# Patient Record
Sex: FEMALE | Race: OTHER | URBAN - METROPOLITAN AREA
[De-identification: names, ages, dates, MRNs, and addresses within clinical notes are randomized per-mention and may not be internally consistent; named-entity substitution may affect disease eponyms.]

---

## 2021-11-02 ENCOUNTER — HOSPITAL ENCOUNTER (OUTPATIENT)
Dept: LAB | Age: 76
Discharge: HOME | End: 2021-11-02

## 2021-11-02 ENCOUNTER — LAB ONLY (OUTPATIENT)
Dept: FAMILY MEDICINE CLINIC | Age: 76
End: 2021-11-02

## 2021-11-02 ENCOUNTER — VIRTUAL VISIT (OUTPATIENT)
Dept: FAMILY MEDICINE CLINIC | Age: 76
End: 2021-11-02

## 2021-11-02 DIAGNOSIS — H53.8 BLURRED VISION: ICD-10-CM

## 2021-11-02 DIAGNOSIS — E03.9 ACQUIRED HYPOTHYROIDISM: Primary | ICD-10-CM

## 2021-11-02 DIAGNOSIS — E03.9 ACQUIRED HYPOTHYROIDISM: ICD-10-CM

## 2021-11-02 PROCEDURE — 85027 COMPLETE CBC AUTOMATED: CPT

## 2021-11-02 PROCEDURE — 99441 PR PHYS/QHP TELEPHONE EVALUATION 5-10 MIN: CPT | Performed by: FAMILY MEDICINE

## 2021-11-02 PROCEDURE — 84443 ASSAY THYROID STIM HORMONE: CPT

## 2021-11-02 PROCEDURE — 80053 COMPREHEN METABOLIC PANEL: CPT

## 2021-11-02 RX ORDER — LEVOTHYROXINE SODIUM 100 UG/1
100 TABLET ORAL
COMMUNITY
End: 2021-11-02 | Stop reason: SDUPTHER

## 2021-11-02 RX ORDER — LEVOTHYROXINE SODIUM 100 UG/1
100 TABLET ORAL
Qty: 90 TABLET | Refills: 1 | Status: SHIPPED | OUTPATIENT
Start: 2021-11-02 | End: 2022-05-13

## 2021-11-02 NOTE — PROGRESS NOTES
Eli Ortega (: 1945) is a 76 y.o. female, new patient, here for evaluation of the following chief complaint(s):   Thyroid Problem (This is a new pt with Thyroid Hx for 20 years and had no taking medicine x 2 years ), Blurred Vision (x 1 month), and Medication Refill       ASSESSMENT/PLAN:  1. Acquired hypothyroidism  Continue with current dose and recheck labs. -     TSH 3RD GENERATION; Future  2. Blurred vision  New sx, will have patient make F2F visit and check labs. -     CBC W/O DIFF; Future  -     METABOLIC PANEL, COMPREHENSIVE; Future  -     REFERRAL TO OPHTHALMOLOGY      Return for follow up for F2F next available for check up. SUBJECTIVE/OBJECTIVE:  HPI New patient. Visit assisted with daughter, Marge Thorne From [facility], in [facility] for 6 months. Hypothyroid:  On thyroid replacement for 20 years. Patient is taking Synthroid 100 mcg regularly but is almost out. Has not had labs in about 3 years due to the pandemic. Blurred Vision:  Increasing blurred vision x 1 month. Review of Systems     Patient-Reported Systolic (Top): 516  Patient-Reported Diastolic (Bottom): 80  Patient-Reported Pulse: 89  Patient-Reported Weight: 160 lb  Patient-Reported LMP: Menopause    Physical Exam    On this date 2021 I have spent 9 minutes reviewing previous notes, test results and face to face (virtual) with the patient discussing the diagnosis and importance of compliance with the treatment plan as well as documenting on the day of the visit. Eli Ortega, was evaluated through a synchronous (real-time) audio-video encounter. The patient (or guardian if applicable) is aware that this is a billable service. Verbal consent to proceed has been obtained within the past 12 months.  The visit was conducted pursuant to the emergency declaration under the [address] [facility] [place], [address] waiver authority and the [employer] and [employer] Appropriations Act. Patient identification was verified, and a caregiver was present when appropriate. The patient was located in a state where the provider was credentialed to provide care. Patient identification was verified at the start of the visit: YES    Services were provided through a phone synchronous discussion virtually to substitute for in-person clinic visit. Patient was located at home and provider was located in office or at home. An electronic signature was used to authenticate this note.   -- Maryjo Vega MD

## 2021-11-02 NOTE — PROGRESS NOTES
Per patient her BP today is 137/80 on RA. HR 89  98100 am: CMA called the patient's daughter Mrs Breanna Nunez to explained that medicine has no coupon available and price will be $10. Per provider, also,  the following instructions were give to the patient: \"follow up for F2F next available for check up   Labs ordered.  Can be done next available or when I see her F2F. Medication eRxd,  Referral to [facility] for blurred vision. \" Dtr of patient verbalized understanding

## 2021-11-03 LAB
ALBUMIN SERPL-MCNC: 3.6 G/DL (ref 3.5–5)
ALBUMIN/GLOB SERPL: 1.1 {RATIO} (ref 1.1–2.2)
ALP SERPL-CCNC: 79 U/L (ref 45–117)
ALT SERPL-CCNC: 28 U/L (ref 12–78)
ANION GAP SERPL CALC-SCNC: 6 MMOL/L (ref 5–15)
AST SERPL-CCNC: 17 U/L (ref 15–37)
BILIRUB SERPL-MCNC: 0.3 MG/DL (ref 0.2–1)
BUN SERPL-MCNC: 18 MG/DL (ref 6–20)
BUN/CREAT SERPL: 22 (ref 12–20)
CALCIUM SERPL-MCNC: 8.8 MG/DL (ref 8.5–10.1)
CHLORIDE SERPL-SCNC: 108 MMOL/L (ref 97–108)
CO2 SERPL-SCNC: 26 MMOL/L (ref 21–32)
CREAT SERPL-MCNC: 0.83 MG/DL (ref 0.55–1.02)
ERYTHROCYTE [DISTWIDTH] IN BLOOD BY AUTOMATED COUNT: 12.5 % (ref 11.5–14.5)
GLOBULIN SER CALC-MCNC: 3.4 G/DL (ref 2–4)
GLUCOSE SERPL-MCNC: 102 MG/DL (ref 65–100)
HCT VFR BLD AUTO: 38 % (ref 35–47)
HGB BLD-MCNC: 12.1 G/DL (ref 11.5–16)
MCH RBC QN AUTO: 30 PG (ref 26–34)
MCHC RBC AUTO-ENTMCNC: 31.8 G/DL (ref 30–36.5)
MCV RBC AUTO: 94.3 FL (ref 80–99)
NRBC # BLD: 0 K/UL (ref 0–0.01)
NRBC BLD-RTO: 0 PER 100 WBC
PLATELET # BLD AUTO: 286 K/UL (ref 150–400)
PMV BLD AUTO: 10.9 FL (ref 8.9–12.9)
POTASSIUM SERPL-SCNC: 4.1 MMOL/L (ref 3.5–5.1)
PROT SERPL-MCNC: 7 G/DL (ref 6.4–8.2)
RBC # BLD AUTO: 4.03 M/UL (ref 3.8–5.2)
SODIUM SERPL-SCNC: 140 MMOL/L (ref 136–145)
TSH SERPL DL<=0.05 MIU/L-ACNC: 3.33 UIU/ML (ref 0.36–3.74)
WBC # BLD AUTO: 7.5 K/UL (ref 3.6–11)

## 2021-11-04 ENCOUNTER — OFFICE VISIT (OUTPATIENT)
Dept: FAMILY MEDICINE CLINIC | Age: 76
End: 2021-11-04

## 2021-11-04 VITALS
OXYGEN SATURATION: 97 % | TEMPERATURE: 98.6 F | WEIGHT: 172.4 LBS | SYSTOLIC BLOOD PRESSURE: 132 MMHG | DIASTOLIC BLOOD PRESSURE: 71 MMHG | HEART RATE: 71 BPM

## 2021-11-04 DIAGNOSIS — B35.1 ONYCHOMYCOSIS: ICD-10-CM

## 2021-11-04 DIAGNOSIS — E03.9 ACQUIRED HYPOTHYROIDISM: Primary | ICD-10-CM

## 2021-11-04 DIAGNOSIS — H53.8 BLURRED VISION: ICD-10-CM

## 2021-11-04 PROCEDURE — 99213 OFFICE O/P EST LOW 20 MIN: CPT | Performed by: FAMILY MEDICINE

## 2021-11-04 RX ORDER — CICLOPIROX 80 MG/ML
SOLUTION TOPICAL
Qty: 6.6 ML | Refills: 2 | Status: SHIPPED | OUTPATIENT
Start: 2021-11-04 | End: 2022-07-11 | Stop reason: ALTCHOICE

## 2021-11-04 NOTE — PROGRESS NOTES
Patient seen at discharge with daughter present. Nurse explained the [employer] and about using it for pharmacy discounts. [facility] appt discussed, explained that an OW will call patient to begin a f/s process and then the [facility] Nurse, Rizwan Garcia, will give patient appointment. Staff message sent to OW and to Deven Sellers RN. This has been fully explained to the patient, who indicates understanding.

## 2021-11-04 NOTE — PROGRESS NOTES
Stanislaw Hurley (: 1945) is a 76 y.o. female, established patient, here for evaluation of the following chief complaint(s):  Follow-up (Thyroid, lab work)       ASSESSMENT/PLAN:  1. Acquired hypothyroidism  At goal, continue with current dose. 2. Blurred vision  Labs and general exam is unremarkable. Follow up with Ophthalmology  3. Onychomycosis  Discussed oral vs topical tx. Daughter wanted to avoid oral at this time. Will give trial of Loprox. SUBJECTIVE:  HPI   Presents with daughter, Yanique Mobley From [facility], in [place] for 6 months. Recent labs reviewed with patient and daughter. Hypothyroid:  On thyroid replacement for 20 years. Patient is taking Synthroid 100 mcg regularly but is almost out. Has not had labs in about 3 years due to the pandemic. Blurred Vision:  Increasing blurred vision x 1 month. Finger nail changes. Review of Systems   Constitutional: Negative for diaphoresis, fatigue and fever. Respiratory: Negative for cough, chest tightness and shortness of breath. Cardiovascular: Negative for chest pain, palpitations and leg swelling. Neurological: Negative for syncope and light-headedness. OBJECTIVE:  Blood pressure 132/71, pulse 71, temperature 98.6 °F (37 °C), temperature source Oral, weight 172 lb 6.4 oz (78.2 kg), SpO2 97 %. Physical Exam  CONSTITUTIONAL:  Well developed. No apparent distress. PSYCHIATRIC: Oriented to time, place, person & situation. Appropriate mood and affect. EYE: EOEMi, PERRL. No photophobia. Lids without swelling. Sclera without erythema. NECK:  Normal inspection, normal palpation without any lymphadenopathy, masses, or thyromegaly  CARDIOVASCULAR:  Regular rate and rhythm. Normal S1, S2. No extra sounds. RESPIRATORY:  Normal effort. Normal ascultation without wheezing. VASCULAR:  Normal Carotid pulses without bruits. Normal Posterior Tibialis pulses. EXTREMITIES:  No edema.   SKIN:  Several finger nails with onycholysis. Thumb involves most of the thumb. Other nails the distal 5-7 mm. No results found for this visit on 11/04/21. An electronic signature was used to authenticate this note.   -- Vibha Mendoza MD

## 2021-11-04 NOTE — PROGRESS NOTES
Patient received vaccine in [facility]. Sinovoc 3/26/21, 4/30/21  Coordination of Care  1. Have you been to the ER, urgent care clinic since your last visit? Hospitalized since your last visit? No    2. Have you seen or consulted any other health care providers outside of the [address] [place] [address] since your last visit? Include any pap smears or colon screening. No    Does the patient need refills? NO    Learning Assessment Complete?  yes  Depression Screening complete in the past 12 months? yes

## 2021-11-24 ENCOUNTER — TELEPHONE (OUTPATIENT)
Dept: FAMILY MEDICINE CLINIC | Age: 76
End: 2021-11-24

## 2021-11-24 NOTE — TELEPHONE ENCOUNTER
Message left about patient waiting on [facility] appointment. RN called daughter and informed her of status of [facility] services. Daughter verbalized understanding. Message will be sent to provider to see if patient can do a AN referral instead.   Antolin Mohr

## 2022-02-01 ENCOUNTER — TELEPHONE (OUTPATIENT)
Dept: FAMILY MEDICINE CLINIC | Age: 77
End: 2022-02-01

## 2022-02-01 NOTE — TELEPHONE ENCOUNTER
Thalia Sanchez  Patient is requesting medicine refills and also want to talk to a nurse about referring her to [facility] patient will be waiting for a phone call as soon is possible .     Thank you   Bety Warren

## 2022-02-03 NOTE — TELEPHONE ENCOUNTER
Tc to the pt with [facility] Int # [id number]. The Dtr answered the call and she is on the [address] [place] [address] Road. The pt is requesting refills for the Levothyroxine 100 mcg,1 tablet by mouth daily. #90, 1 refill. This rx was ordered 11/02/21. This is enough for 6 month's. A refill is not due yet, she has 1 refill at the Pharmacy. The refill process was explained to the Dtr. She was told to call the Pharmacy there should be a [place] citizen option for her to choose. She can enter in the Rx # and then go pick it up when the automated voice states the time it will be ready for , or she can take the bottle to the Pharmacy and they will refill it while she waits. The Dtr verbalized understanding. The dtr was asked about her question for Ophth. appt at [facility]. The she was told the pt will have to see the [facility] provider for a referral. The dtr stated she was given a referral and [facility] has never called them for the appt. Sending this to the provider.  The pt has been here according to the note for 6 month's can she have a AN referral? Sergio Ruiz RN

## 2022-02-07 NOTE — TELEPHONE ENCOUNTER
I entered AN referral to Ophthalmology. Initial referral was for [facility] but I think this was during their transition to AN and so I think her case was never picked up by [facility].     Dr Deanna West

## 2022-02-08 ENCOUNTER — TELEPHONE (OUTPATIENT)
Dept: FAMILY MEDICINE CLINIC | Age: 77
End: 2022-02-08

## 2022-02-08 NOTE — TELEPHONE ENCOUNTER
AXEL OROZCO called patient and was unable to speak with patient or leave a message. An automated message saying: \"I'm sorry, but the person you've called has a mail box that has not been set up yet. Good bye\".

## 2022-02-09 ENCOUNTER — TELEPHONE (OUTPATIENT)
Dept: FAMILY MEDICINE CLINIC | Age: 77
End: 2022-02-09

## 2022-02-09 NOTE — TELEPHONE ENCOUNTER
AXEL OROZCO called patient and started financial screening for AN. An appt was made for 2/16/22 at 9:45 am. Patient replied NO to all covid19 screening questions. Pending SL.

## 2022-02-16 ENCOUNTER — OFFICE VISIT (OUTPATIENT)
Dept: FAMILY MEDICINE CLINIC | Age: 77
End: 2022-02-16

## 2022-02-16 ENCOUNTER — DOCUMENTATION ONLY (OUTPATIENT)
Dept: FAMILY MEDICINE CLINIC | Age: 77
End: 2022-02-16

## 2022-02-16 DIAGNOSIS — Z71.89 COUNSELING AND COORDINATION OF CARE: Primary | ICD-10-CM

## 2022-02-16 PROCEDURE — 99080 SPECIAL REPORTS OR FORMS: CPT | Performed by: PHYSICIAN ASSISTANT

## 2022-02-16 NOTE — PROGRESS NOTES
OW met with patient and her daughter, Ms Rita Del Real. Patient signed forms. OW notarized  for patient. AN application was completed and left on AN tray. OW instructed patient to call AN on/after 3/2/22 to confirm referral.   Pt was screened for SDOH. Patient opted out but requested information about legal assistance. OW provided [employer] ([employer]), [facility] and  Society information to patient.

## 2022-03-03 ENCOUNTER — TELEPHONE (OUTPATIENT)
Dept: FAMILY MEDICINE CLINIC | Age: 77
End: 2022-03-03

## 2022-03-03 NOTE — TELEPHONE ENCOUNTER
Patient's daughter, Mrs Ashlee Umaña, called OW back. OW explained that a more up-to-date proof of senior living income is needed. Mrs Ashlee Umaña said she's trying to get one since she \"spoke with an AN lady\" and will send it to OW via e-mail as soon as she has it. OW confirmed e-mail with Mrs Ashlee Umaña.

## 2022-03-03 NOTE — TELEPHONE ENCOUNTER
AXEL OROZCO called patient and was unable to speak with patient or leave a message. Mail box has not been set up yet. OW sent a text message explaining the reason of the call and asking to call back.

## 2022-03-09 ENCOUNTER — TELEPHONE (OUTPATIENT)
Dept: FAMILY MEDICINE CLINIC | Age: 77
End: 2022-03-09

## 2022-03-09 NOTE — TELEPHONE ENCOUNTER
OW received an e-mail from patient with [employer] dated letter and sent it to Davida Nazario via [employer]. Waiting for Davida Nazario to give patient AN's call date.

## 2022-07-11 ENCOUNTER — OFFICE VISIT (OUTPATIENT)
Dept: FAMILY MEDICINE CLINIC | Age: 77
End: 2022-07-11

## 2022-07-11 VITALS
HEART RATE: 80 BPM | BODY MASS INDEX: 34.47 KG/M2 | DIASTOLIC BLOOD PRESSURE: 67 MMHG | HEIGHT: 59 IN | OXYGEN SATURATION: 98 % | TEMPERATURE: 97.8 F | WEIGHT: 171 LBS | SYSTOLIC BLOOD PRESSURE: 126 MMHG

## 2022-07-11 DIAGNOSIS — Z01.818 PREOP GENERAL PHYSICAL EXAM: Primary | ICD-10-CM

## 2022-07-11 PROCEDURE — 1123F ACP DISCUSS/DSCN MKR DOCD: CPT | Performed by: NURSE PRACTITIONER

## 2022-07-11 PROCEDURE — 99213 OFFICE O/P EST LOW 20 MIN: CPT | Performed by: NURSE PRACTITIONER

## 2022-07-11 RX ORDER — PREDNISOLONE ACETATE 10 MG/ML
SUSPENSION/ DROPS OPHTHALMIC
COMMUNITY
Start: 2022-06-30 | End: 2022-07-11 | Stop reason: ALTCHOICE

## 2022-07-11 RX ORDER — OFLOXACIN 3 MG/ML
SOLUTION/ DROPS OPHTHALMIC
COMMUNITY
Start: 2022-06-29 | End: 2022-07-11 | Stop reason: ALTCHOICE

## 2022-07-11 NOTE — PROGRESS NOTES
I have printed AVS and reviewed it with patient today. Patient verbalized understanding. The patient's pre-op information was copied and faxed to the [facility] and the original was returned to the patient. Patient correctly stated her full name and date of birth prior to the information shared.  [id number] with the [facility] services assisted with this discharge.  Sandee Avila RN

## 2022-07-11 NOTE — PROGRESS NOTES
History and Physical    Patient: Leda Cornell  MRN: [id number]  SSN: [id number]   YOB: 1945  Age: 68 y.o. Sex: female     Patient scheduled for: Cataract extraction, both eyes, regional anesthesia. Date of surgery: 7/21/22. Location of surgery: [place] [address]. Surgeon: Dr. Dean Gao. No past medical history on file. No past surgical history on file.   No Known Allergies  Current Outpatient Medications   Medication Sig Dispense Refill    Euthyrox 100 mcg tablet TAKE 1 TABLET BY MOUTH ONCE DAILY BEFORE BREAKFAST 90 Tablet 1         Physical Examination    Visit Vitals  /67 (BP 1 Location: Left upper arm, BP Patient Position: Sitting)   Pulse 80   Temp 97.8 °F (36.6 °C) (Temporal)   Ht 4' 11.33\" (1.507 m)   Wt 171 lb (77.6 kg)   SpO2 98%   BMI 34.15 kg/m²     68 y.o. female in no acute distress  SEE SCANNED FORM      Zachary Mccray NP  7/11/2022

## 2022-10-14 ENCOUNTER — OFFICE VISIT (OUTPATIENT)
Dept: FAMILY MEDICINE CLINIC | Age: 77
End: 2022-10-14

## 2022-10-14 DIAGNOSIS — Z71.89 COUNSELING AND COORDINATION OF CARE: Primary | ICD-10-CM

## 2022-10-14 PROCEDURE — 99080 SPECIAL REPORTS OR FORMS: CPT | Performed by: PHYSICIAN ASSISTANT

## 2022-10-14 NOTE — PROGRESS NOTES
Patient is receiving bills after Eyes surgery through AN. OW provided AN billing phone number for her to call and explained the process. Patient was requesting FA application. OW explained that patient has no pending BS balance and needs to call AN. Patient also asked about Dental resources for her. OW provided Resources.

## 2022-12-23 ENCOUNTER — OFFICE VISIT (OUTPATIENT)
Dept: FAMILY MEDICINE CLINIC | Age: 77
End: 2022-12-23

## 2022-12-23 ENCOUNTER — HOSPITAL ENCOUNTER (OUTPATIENT)
Dept: LAB | Age: 77
Discharge: HOME | End: 2022-12-23

## 2022-12-23 VITALS
WEIGHT: 175 LBS | DIASTOLIC BLOOD PRESSURE: 83 MMHG | OXYGEN SATURATION: 97 % | BODY MASS INDEX: 35.28 KG/M2 | SYSTOLIC BLOOD PRESSURE: 138 MMHG | TEMPERATURE: 98 F | HEIGHT: 59 IN | HEART RATE: 66 BPM

## 2022-12-23 DIAGNOSIS — E03.9 ACQUIRED HYPOTHYROIDISM: Primary | ICD-10-CM

## 2022-12-23 DIAGNOSIS — B35.1 ONYCHOMYCOSIS: ICD-10-CM

## 2022-12-23 DIAGNOSIS — E03.9 ACQUIRED HYPOTHYROIDISM: ICD-10-CM

## 2022-12-23 PROCEDURE — 36415 COLL VENOUS BLD VENIPUNCTURE: CPT

## 2022-12-23 PROCEDURE — 85027 COMPLETE CBC AUTOMATED: CPT

## 2022-12-23 PROCEDURE — 1123F ACP DISCUSS/DSCN MKR DOCD: CPT | Performed by: FAMILY MEDICINE

## 2022-12-23 PROCEDURE — 80053 COMPREHEN METABOLIC PANEL: CPT

## 2022-12-23 PROCEDURE — 84443 ASSAY THYROID STIM HORMONE: CPT

## 2022-12-23 PROCEDURE — 99213 OFFICE O/P EST LOW 20 MIN: CPT | Performed by: FAMILY MEDICINE

## 2022-12-23 RX ORDER — CICLOPIROX 80 MG/ML
SOLUTION TOPICAL
Qty: 6.6 ML | Refills: 4 | Status: SHIPPED | OUTPATIENT
Start: 2022-12-23

## 2022-12-23 RX ORDER — LEVOTHYROXINE SODIUM 100 UG/1
100 TABLET ORAL
Qty: 90 TABLET | Refills: 3 | Status: SHIPPED | OUTPATIENT
Start: 2022-12-23

## 2022-12-23 NOTE — PROGRESS NOTES
Patient discharged with AVS. Name and date of birth verified. Made aware of prescriptions sent to pharmacy. Medication review completed. Instructed to schedule a follow-up appointment in 1 year. Patient was given an opportunity to voice questions/concerns. All questions were addressed. Patient verbalized understanding of information given. MACK interpretor Rodriguez Jones assisted.

## 2022-12-23 NOTE — PROGRESS NOTES
Coordination of Care  1. Have you been to the ER, urgent care clinic since your last visit? Hospitalized since your last visit? No    2. Have you seen or consulted any other health care providers outside of the [address] [place] [address] since your last visit? Include any pap smears or colon screening. No    Does the patient need refills? YES    Learning Assessment Complete?  yes  Depression Screening complete in the past 12 months? yes

## 2022-12-23 NOTE — PROGRESS NOTES
Lonnie Montalvo (: 1945) is a 68 y.o. female, established patient, here for evaluation of the following chief complaint(s):  Thyroid Problem (Yearly check for thyroid), Labs (/), and Nail Problem (Patient c/o fungus on nails of hands)       ASSESSMENT/PLAN:  1. Acquired hypothyroidism  Recheck labs. Added CBC due to mildly elevated MCV last year and CMP due to hyperglycemia recheck  -     CBC W/O DIFF; Future  -     METABOLIC PANEL, COMPREHENSIVE; Future  -     TSH 3RD GENERATION; Future  2. Onychomycosis  Again reviewed options or topical vs oral tx. Pt and daugther opt for topical tx for a longer time. Follow-up and Dispositions    Return for follow up for F2F in 1 year for thyroid check. SUBJECTIVE:  Presents with daughter, Hugo Fuentes. From [facility]. Cataract surgery went well. Hypothyroid:  On thyroid replacement for 20 years. Patient is taking Synthroid 100 mcg regularly without any new complaints. Finger nail changes:  5 years affected. Continues to be affected. Use Loprox for several months without any change noted. Review of Systems   Constitutional:  Negative for appetite change, fatigue and unexpected weight change. Cardiovascular:  Negative for leg swelling. Neurological:  Negative for weakness and light-headedness. Psychiatric/Behavioral:  Negative for confusion. Denies memory difficulty. OBJECTIVE:  Blood pressure 138/83, pulse 66, temperature 98 °F (36.7 °C), temperature source Temporal, height 4' 11.33\" (1.507 m), weight 175 lb (79.4 kg), SpO2 97 %. Physical Exam  CONSTITUTIONAL:  Well developed. No apparent distress. PSYCHIATRIC: Oriented to time, place, person & situation. Appropriate mood and affect. NECK:  Normal inspection, normal palpation without any lymphadenopathy, masses, or thyromegaly  CARDIOVASCULAR:  Regular rate and rhythm. Normal S1, S2. No extra sounds. RESPIRATORY:  Normal effort.   Normal ascultation without wheezing. VASCULAR:  Normal Carotid pulses without bruits. Normal Posterior Tibialis pulses. EXTREMITIES:  No edema. SKIN:  Rt hand with Thumb, index, mid finger nails with onycholysis. Thumb is 50% involved. Other nails the distal 5-7 mm. No results found for this visit on 12/23/22. An electronic signature was used to authenticate this note.   -- Collette Majors, MD

## 2022-12-24 LAB
ALBUMIN SERPL-MCNC: 3.8 G/DL (ref 3.5–5)
ALBUMIN/GLOB SERPL: 1.3 {RATIO} (ref 1.1–2.2)
ALP SERPL-CCNC: 83 U/L (ref 45–117)
ALT SERPL-CCNC: 23 U/L (ref 12–78)
ANION GAP SERPL CALC-SCNC: 5 MMOL/L (ref 5–15)
AST SERPL-CCNC: 18 U/L (ref 15–37)
BILIRUB SERPL-MCNC: 0.4 MG/DL (ref 0.2–1)
BUN SERPL-MCNC: 18 MG/DL (ref 6–20)
BUN/CREAT SERPL: 23 (ref 12–20)
CALCIUM SERPL-MCNC: 8.8 MG/DL (ref 8.5–10.1)
CHLORIDE SERPL-SCNC: 108 MMOL/L (ref 97–108)
CO2 SERPL-SCNC: 26 MMOL/L (ref 21–32)
CREAT SERPL-MCNC: 0.79 MG/DL (ref 0.55–1.02)
ERYTHROCYTE [DISTWIDTH] IN BLOOD BY AUTOMATED COUNT: 12.7 % (ref 11.5–14.5)
GLOBULIN SER CALC-MCNC: 3 G/DL (ref 2–4)
GLUCOSE SERPL-MCNC: 94 MG/DL (ref 65–100)
HCT VFR BLD AUTO: 36.7 % (ref 35–47)
HGB BLD-MCNC: 11.8 G/DL (ref 11.5–16)
MCH RBC QN AUTO: 29.2 PG (ref 26–34)
MCHC RBC AUTO-ENTMCNC: 32.2 G/DL (ref 30–36.5)
MCV RBC AUTO: 90.8 FL (ref 80–99)
NRBC # BLD: 0 K/UL (ref 0–0.01)
NRBC BLD-RTO: 0 PER 100 WBC
PLATELET # BLD AUTO: 247 K/UL (ref 150–400)
PMV BLD AUTO: 10.8 FL (ref 8.9–12.9)
POTASSIUM SERPL-SCNC: 4.2 MMOL/L (ref 3.5–5.1)
PROT SERPL-MCNC: 6.8 G/DL (ref 6.4–8.2)
RBC # BLD AUTO: 4.04 M/UL (ref 3.8–5.2)
SODIUM SERPL-SCNC: 139 MMOL/L (ref 136–145)
TSH SERPL DL<=0.05 MIU/L-ACNC: 3.45 UIU/ML (ref 0.36–3.74)
WBC # BLD AUTO: 7 K/UL (ref 3.6–11)

## 2022-12-28 NOTE — PROGRESS NOTES
Tc to the pt's dtr on the [facility]. The only number listed for the pt. No answer. The mailbox is full and no messages can be left at this time. The phone automatically hangs up. I called 2x. A lab letter is created and sent to the queue.  Yaneli Eckert RN

## 2023-02-14 ENCOUNTER — VIRTUAL VISIT (OUTPATIENT)
Dept: FAMILY MEDICINE CLINIC | Age: 78
End: 2023-02-14

## 2023-02-14 DIAGNOSIS — M54.50 ACUTE MIDLINE LOW BACK PAIN WITHOUT SCIATICA: Primary | ICD-10-CM

## 2023-02-14 DIAGNOSIS — R42 DIZZINESS: ICD-10-CM

## 2023-02-14 PROCEDURE — 99441 PR PHYS/QHP TELEPHONE EVALUATION 5-10 MIN: CPT | Performed by: FAMILY MEDICINE

## 2023-02-14 NOTE — PROGRESS NOTES
Tc to the pt for intake. Nury Fajardo was the . The Dtr Darron Fregoso on the [facility], answered the phone, and answered the questions for the pt. The pt's dtr stated the pt had labs done and no one ever called her about them so they assumed they were normal. The chart was reviewed. They pt was called 2x. No answer. The mailbox was full, and I could not leave a message I mailed a letter to her home. The dtr stated she did not get the letter. We reviewed her address. It is correct per the pt in the chart. There is no apt letter or number. I gave her the lab result message from the provider from the pt's last labs done in Dec 2022. Coordination of Care  1. Have you been to the ER, urgent care clinic since your last visit? Hospitalized since your last visit? No    2. Have you seen or consulted any other health care providers outside of the [address] [place] [address] since your last visit? Include any pap smears or colon screening. No    Does the patient need refills? NO    Learning Assessment Complete?  No  Depression Screening complete in the past 12 months? yes

## 2023-02-14 NOTE — PROGRESS NOTES
Svitlana Sewell (: 1945) is a 68 y.o. female, established patient, Virtual Visit for evaluation of the following chief complaint(s):   Dizziness (Started yesterday. Today she is feeling better. All day long yesterday she was dizzy.) and LOW BACK PAIN (The pain started 5 days ago. Pain is there even tho we are rubbing creams and giving Tylenol, but is not going away. Had hot feet, and tingling in both feet. )     ASSESSMENT/PLAN:  1. Acute midline low back pain without sciatica  She does not have a hx of LBP or dizziness and so recommended F2F visit for evaluation for infection, BP, compression fx. 2. Dizziness  As above. Return for Needs F2F ASAP with any provider for dizziness. .    SUBJECTIVE/OBJECTIVE:  VV assisted by daughter for dizziness and low back pain. Recent labs from 2022 were reviewed with patient and daughter and were NL. LBP:  started with LBP 5 days ago around waist that is constant. Pain does not radiate or improve with position. Not relieved with rubs or Tylenol. Has noted some Lt elbow pain around the same time. Denies fevers, dysuria, abdominal pain. Dizziness:  spell yesterday of dizziness that most of the day. She had some associated paresthesias in her feet that resolved. Today sx have resolved but she still isn't feeling back to baseline. Review of Systems   Constitutional:  Negative for chills and fever. Respiratory:  Positive for cough. Negative for shortness of breath and wheezing. Genitourinary:  Negative for dysuria and frequency. Chronic sporadic cough, no worse. Patient-Reported LMP: menopause. Physical Exam    On this date 2023 I have spent 9 minutes reviewing previous notes, test results and face to face (virtual) with the patient discussing the diagnosis and importance of compliance with the treatment plan as well as documenting on the day of the visit.   Svitlana Sewell, was evaluated through a synchronous (real-time) audio-video encounter. The patient (or guardian if applicable) is aware that this is a billable service, which includes applicable co-pays. This Virtual Visit was conducted with patient's (and/or legal guardian's) consent. The visit was conducted pursuant to the emergency declaration under the [address] [place] [address] authority and the [employer] General Act. Patient identification was verified, and a caregiver was present when appropriate. Patient identification was verified at the start of the visit: YES    Services were provided through a phone synchronous discussion virtually to substitute for in-person clinic visit. Patient was located at home and provider was located in office or at home. An electronic signature was used to authenticate this note.   -- Aidee Palacios MD

## 2023-02-15 ENCOUNTER — OFFICE VISIT (OUTPATIENT)
Dept: FAMILY MEDICINE CLINIC | Age: 78
End: 2023-02-15

## 2023-02-15 VITALS
HEART RATE: 72 BPM | SYSTOLIC BLOOD PRESSURE: 138 MMHG | HEIGHT: 61 IN | WEIGHT: 175.4 LBS | OXYGEN SATURATION: 98 % | DIASTOLIC BLOOD PRESSURE: 70 MMHG | TEMPERATURE: 97.3 F | BODY MASS INDEX: 33.12 KG/M2

## 2023-02-15 DIAGNOSIS — Z13.9 ENCOUNTER FOR SCREENING: Primary | ICD-10-CM

## 2023-02-15 DIAGNOSIS — M54.89 BACK PAIN WITHOUT SCIATICA: ICD-10-CM

## 2023-02-15 LAB
BILIRUB UR QL STRIP: NEGATIVE
GLUCOSE POC: NORMAL MG/DL
GLUCOSE UR-MCNC: NEGATIVE MG/DL
HGB BLD-MCNC: 14 G/DL
KETONES P FAST UR STRIP-MCNC: NEGATIVE MG/DL
PH UR STRIP: 5.5 [PH] (ref 4.6–8)
PROT UR QL STRIP: NEGATIVE
SP GR UR STRIP: 1.03 (ref 1–1.03)
UA UROBILINOGEN AMB POC: NORMAL (ref 0.2–1)
URINALYSIS CLARITY POC: CLEAR
URINALYSIS COLOR POC: YELLOW
URINE BLOOD POC: NORMAL
URINE LEUKOCYTES POC: NEGATIVE
URINE NITRITES POC: NEGATIVE

## 2023-02-15 PROCEDURE — 1123F ACP DISCUSS/DSCN MKR DOCD: CPT | Performed by: PHYSICIAN ASSISTANT

## 2023-02-15 PROCEDURE — 81002 URINALYSIS NONAUTO W/O SCOPE: CPT | Performed by: PHYSICIAN ASSISTANT

## 2023-02-15 PROCEDURE — 82962 GLUCOSE BLOOD TEST: CPT | Performed by: PHYSICIAN ASSISTANT

## 2023-02-15 PROCEDURE — 99213 OFFICE O/P EST LOW 20 MIN: CPT | Performed by: PHYSICIAN ASSISTANT

## 2023-02-15 PROCEDURE — 85018 HEMOGLOBIN: CPT | Performed by: PHYSICIAN ASSISTANT

## 2023-02-15 RX ORDER — DICLOFENAC SODIUM 10 MG/G
2 GEL TOPICAL 4 TIMES DAILY
Qty: 100 G | Refills: 0 | Status: SHIPPED | OUTPATIENT
Start: 2023-02-15

## 2023-02-15 RX ORDER — NAPROXEN 500 MG/1
500 TABLET ORAL 2 TIMES DAILY WITH MEALS
Qty: 20 TABLET | Refills: 0 | Status: SHIPPED | OUTPATIENT
Start: 2023-02-15

## 2023-02-15 NOTE — PROGRESS NOTES
Assessment/Plan:    Diagnoses and all orders for this visit:    1. Encounter for screening  -     AMB POC GLUCOSE BLOOD, BY GLUCOSE MONITORING DEVICE  -     AMB POC URINALYSIS DIP STICK MANUAL W/O MICRO  -     AMB POC HEMOGLOBIN (HGB)    2. Back pain without sciatica  -     naproxen (NAPROSYN) 500 mg tablet; Take 1 Tablet by mouth two (2) times daily (with meals). -     diclofenac (VOLTAREN) 1 % gel; Apply 2 g to affected area four (4) times daily. Warning signs of when to seek care were discussed with pt today and include worsening of sxs or new sxs         [facility]. An AVS was printed and given to the patient.      ----------------------------------------------------------------------    Chief Complaint   Patient presents with    Flank Pain    Dizziness     Patient states she felt nauseated as well and her feet felt hot       History of Present Illness:  69 yo new pt to me so I have reviewed her chart and pertinent lab hx before the visit, she is being seen today for andrzej low back/ CVA area pain  The pain started a few days ago and is not associated with dysuria- specifically no burning with urination, no frequency, no incontinence, no incomplete voiding sensation  The sxs initially were coupled with one episode of dizziness (2-3 days ago) that lasted seconds and resolved spontaneously. She has not had a recurrence of the dizziness since then  After much discussion and physical, we discussed proper lifting techniques and she said that she always lifts with her back, not correctly with her legs.   I suspect that she has a back strain and her history and physical are consistent with this  She has tried hot/cold to the back and tylenol and other \"natural tx's\" but so far nothing has helped  Sitting for too long makes the sxs worse  No problem with bowel movements  No numbness lower extremity       No past medical history on file.    Current Outpatient Medications   Medication Sig Dispense Refill    naproxen (NAPROSYN) 500 mg tablet Take 1 Tablet by mouth two (2) times daily (with meals). 20 Tablet 0    diclofenac (VOLTAREN) 1 % gel Apply 2 g to affected area four (4) times daily. 100 g 0    ciclopirox (PENLAC) 8 % solution Apply to affected nails once every night for 3 months 6.6 mL 4    levothyroxine (SYNTHROID) 100 mcg tablet Take 1 Tablet by mouth Daily (before breakfast). 90 Tablet 3       No Known Allergies    Social History     Tobacco Use    Smoking status: Never    Smokeless tobacco: Never   Substance Use Topics    Alcohol use: Not Currently    Drug use: Never       No family history on file. Physical Exam:     Visit Vitals  /70 (BP 1 Location: Left upper arm, BP Patient Position: Sitting, BP Cuff Size: Adult)   Pulse 72   Temp 97.3 °F (36.3 °C) (Temporal)   Ht 5' 1.22\" (1.555 m)   Wt 175 lb 6.4 oz (79.6 kg)   SpO2 98%   BMI 32.90 kg/m²     Looks well, pleasant and polite. Appears comfortable  A&Ox3  WDWN NAD  Respirations normal and non labored  Lungs CTA andrzej   Cor RRR s1s2  MSK- she is tender andrzej lower 1/3 of back symmetrically across the back, no rashes present on the skin.  Tender with palpation to this area and tender with side bends andrzej   Gait intact

## 2023-02-15 NOTE — PROGRESS NOTES
Patient discharged with AVS. Name and date of birth verified. Made aware of prescriptions sent to the pharmacy. Medication review completed. GoodRx coupons given for the pharmacy. Patient was given an opportunity to voice questions/concerns. All questions were addressed. HORACIO interpretor Victor Hugo Holden assisted.

## 2023-02-15 NOTE — PROGRESS NOTES
Coordination of Care  1. Have you been to the ER, urgent care clinic since your last visit? Hospitalized since your last visit? No    2. Have you seen or consulted any other health care providers outside of the [address] [place] [address] since your last visit? Include any pap smears or colon screening. No    Does the patient need refills? N/A    Learning Assessment Complete?  yes  Depression Screening complete in the past 12 months? yes\

## 2023-02-15 NOTE — PROGRESS NOTES
Results for orders placed or performed in visit on 02/15/23   AMB POC GLUCOSE BLOOD, BY GLUCOSE MONITORING DEVICE   Result Value Ref Range    Glucose POC F - 84 MG/DL   AMB POC URINALYSIS DIP STICK MANUAL W/O MICRO   Result Value Ref Range    Color (UA POC) Yellow     Clarity (UA POC) Clear     Glucose (UA POC) Negative Negative    Bilirubin (UA POC) Negative Negative    Ketones (UA POC) Negative Negative    Specific gravity (UA POC) 1.030 1.001 - 1.035    Blood (UA POC) 2+ Negative    pH (UA POC) 5.5 4.6 - 8.0    Protein (UA POC) Negative Negative    Urobilinogen (UA POC) 0.2 mg/dL 0.2 - 1    Nitrites (UA POC) Negative Negative    Leukocyte esterase (UA POC) Negative Negative   AMB POC HEMOGLOBIN (HGB)   Result Value Ref Range    Hemoglobin (POC) 14.0 G/DL

## 2023-09-18 ENCOUNTER — HOSPITAL ENCOUNTER (OUTPATIENT)
Facility: HOSPITAL | Age: 78
Setting detail: SPECIMEN
Discharge: HOME | End: 2023-09-21

## 2023-09-18 DIAGNOSIS — E03.9 ACQUIRED HYPOTHYROIDISM: ICD-10-CM

## 2023-09-18 DIAGNOSIS — R10.12 LUQ PAIN: ICD-10-CM

## 2023-09-18 PROCEDURE — 84443 ASSAY THYROID STIM HORMONE: CPT

## 2023-09-18 PROCEDURE — 83690 ASSAY OF LIPASE: CPT

## 2023-09-18 PROCEDURE — 81001 URINALYSIS AUTO W/SCOPE: CPT

## 2023-09-18 PROCEDURE — 85025 COMPLETE CBC W/AUTO DIFF WBC: CPT

## 2023-09-18 PROCEDURE — 80053 COMPREHEN METABOLIC PANEL: CPT

## 2023-09-18 PROCEDURE — 36415 COLL VENOUS BLD VENIPUNCTURE: CPT

## 2023-09-18 PROCEDURE — 87338 HPYLORI STOOL AG IA: CPT

## 2023-09-19 ENCOUNTER — HOSPITAL ENCOUNTER (OUTPATIENT)
Facility: HOSPITAL | Age: 78
Setting detail: SPECIMEN
Discharge: HOME | End: 2023-09-22

## 2023-09-19 ENCOUNTER — NURSE ONLY (OUTPATIENT)
Age: 78
End: 2023-09-19

## 2023-09-19 DIAGNOSIS — R10.12 LUQ PAIN: ICD-10-CM

## 2023-09-19 LAB
ALBUMIN SERPL-MCNC: 3.9 G/DL (ref 3.5–5)
ALBUMIN/GLOB SERPL: 1.1 (ref 1.1–2.2)
ALP SERPL-CCNC: 91 U/L (ref 45–117)
ALT SERPL-CCNC: 29 U/L (ref 12–78)
ANION GAP SERPL CALC-SCNC: 6 MMOL/L (ref 5–15)
APPEARANCE UR: CLEAR
AST SERPL-CCNC: 17 U/L (ref 15–37)
BACTERIA URNS QL MICRO: NEGATIVE /HPF
BASOPHILS # BLD: 0.1 K/UL (ref 0–0.1)
BASOPHILS NFR BLD: 1 % (ref 0–1)
BILIRUB SERPL-MCNC: 0.2 MG/DL (ref 0.2–1)
BILIRUB UR QL: NEGATIVE
BUN SERPL-MCNC: 23 MG/DL (ref 6–20)
BUN/CREAT SERPL: 28 (ref 12–20)
CALCIUM SERPL-MCNC: 8.9 MG/DL (ref 8.5–10.1)
CHLORIDE SERPL-SCNC: 107 MMOL/L (ref 97–108)
CO2 SERPL-SCNC: 26 MMOL/L (ref 21–32)
COLOR UR: ABNORMAL
CREAT SERPL-MCNC: 0.83 MG/DL (ref 0.55–1.02)
DIFFERENTIAL METHOD BLD: ABNORMAL
EOSINOPHIL # BLD: 0.3 K/UL (ref 0–0.4)
EOSINOPHIL NFR BLD: 4 % (ref 0–7)
EPITH CASTS URNS QL MICRO: ABNORMAL /LPF
ERYTHROCYTE [DISTWIDTH] IN BLOOD BY AUTOMATED COUNT: 13.2 % (ref 11.5–14.5)
GLOBULIN SER CALC-MCNC: 3.5 G/DL (ref 2–4)
GLUCOSE SERPL-MCNC: 83 MG/DL (ref 65–100)
GLUCOSE UR STRIP.AUTO-MCNC: NEGATIVE MG/DL
HCT VFR BLD AUTO: 40.6 % (ref 35–47)
HGB BLD-MCNC: 12.4 G/DL (ref 11.5–16)
HGB UR QL STRIP: ABNORMAL
HYALINE CASTS URNS QL MICRO: ABNORMAL /LPF (ref 0–5)
IMM GRANULOCYTES # BLD AUTO: 0 K/UL
IMM GRANULOCYTES NFR BLD AUTO: 0 %
KETONES UR QL STRIP.AUTO: NEGATIVE MG/DL
LEUKOCYTE ESTERASE UR QL STRIP.AUTO: ABNORMAL
LIPASE SERPL-CCNC: 140 U/L (ref 73–393)
LYMPHOCYTES # BLD: 3.8 K/UL (ref 0.8–3.5)
LYMPHOCYTES NFR BLD: 45 % (ref 12–49)
MCH RBC QN AUTO: 28.7 PG (ref 26–34)
MCHC RBC AUTO-ENTMCNC: 30.5 G/DL (ref 30–36.5)
MCV RBC AUTO: 94 FL (ref 80–99)
MONOCYTES # BLD: 0.7 K/UL (ref 0–1)
MONOCYTES NFR BLD: 9 % (ref 5–13)
NEUTS SEG # BLD: 3.4 K/UL (ref 1.8–8)
NEUTS SEG NFR BLD: 41 % (ref 32–75)
NITRITE UR QL STRIP.AUTO: NEGATIVE
NRBC # BLD: 0 K/UL (ref 0–0.01)
NRBC BLD-RTO: 0 PER 100 WBC
PH UR STRIP: 6 (ref 5–8)
PLATELET # BLD AUTO: 275 K/UL (ref 150–400)
PMV BLD AUTO: 11.1 FL (ref 8.9–12.9)
POTASSIUM SERPL-SCNC: 4.4 MMOL/L (ref 3.5–5.1)
PROT SERPL-MCNC: 7.4 G/DL (ref 6.4–8.2)
PROT UR STRIP-MCNC: NEGATIVE MG/DL
RBC # BLD AUTO: 4.32 M/UL (ref 3.8–5.2)
RBC #/AREA URNS HPF: ABNORMAL /HPF (ref 0–5)
RBC MORPH BLD: ABNORMAL
SODIUM SERPL-SCNC: 139 MMOL/L (ref 136–145)
SP GR UR REFRACTOMETRY: 1.01 (ref 1–1.03)
TSH SERPL DL<=0.05 MIU/L-ACNC: 5.48 UIU/ML (ref 0.36–3.74)
UROBILINOGEN UR QL STRIP.AUTO: 0.2 EU/DL (ref 0.2–1)
WBC # BLD AUTO: 8.3 K/UL (ref 3.6–11)
WBC MORPH BLD: ABNORMAL
WBC URNS QL MICRO: ABNORMAL /HPF (ref 0–4)

## 2023-09-19 PROCEDURE — 82274 ASSAY TEST FOR BLOOD FECAL: CPT

## 2023-09-19 NOTE — PROGRESS NOTES
Patient's daughter, Kendra Villegas, arrived for lab drop off. She confirmed pt's full name and . Labs collected per provider orders by Felisha Alejo LPN.      Peterson Durham RN

## 2023-09-21 LAB — HEMOCCULT STL QL IA: NEGATIVE

## 2023-09-22 ENCOUNTER — TELEPHONE (OUTPATIENT)
Age: 78
End: 2023-09-22

## 2023-09-22 DIAGNOSIS — E03.9 ACQUIRED HYPOTHYROIDISM: ICD-10-CM

## 2023-09-22 DIAGNOSIS — K29.70 HELICOBACTER PYLORI GASTRITIS: Primary | ICD-10-CM

## 2023-09-22 DIAGNOSIS — B96.81 HELICOBACTER PYLORI GASTRITIS: Primary | ICD-10-CM

## 2023-09-22 LAB
H PYLORI AG STL QL IA: POSITIVE
SPECIMEN SOURCE: ABNORMAL

## 2023-09-22 RX ORDER — LEVOTHYROXINE SODIUM 112 UG/1
112 TABLET ORAL DAILY
Qty: 30 TABLET | Refills: 5 | Status: SHIPPED | OUTPATIENT
Start: 2023-09-22

## 2023-09-22 RX ORDER — CLARITHROMYCIN 500 MG/1
500 TABLET, COATED ORAL 2 TIMES DAILY
Qty: 28 TABLET | Refills: 0 | Status: SHIPPED | OUTPATIENT
Start: 2023-09-22 | End: 2023-10-06

## 2023-09-22 RX ORDER — PANTOPRAZOLE SODIUM 40 MG/1
40 TABLET, DELAYED RELEASE ORAL
Qty: 60 TABLET | Refills: 0 | Status: SHIPPED | OUTPATIENT
Start: 2023-09-22

## 2023-09-22 RX ORDER — AMOXICILLIN 500 MG/1
1000 CAPSULE ORAL 2 TIMES DAILY
Qty: 56 CAPSULE | Refills: 0 | Status: SHIPPED | OUTPATIENT
Start: 2023-09-22 | End: 2023-10-06

## 2023-09-22 NOTE — TELEPHONE ENCOUNTER
Called to review lab results.    + H Pylori    TSH is supratherapeutic. Negative FIT  CBC, CMP, Lipase, UA WNL. Lab Results   Component Value Date    TSH 3.45 12/23/2022    GXV2YQR 5.48 (H) 09/18/2023     1. Helicobacter pylori gastritis  - pantoprazole (PROTONIX) 40 MG tablet; Take 1 tablet by mouth 2 times daily (before meals)  Dispense: 60 tablet; Refill: 0  - amoxicillin (AMOXIL) 500 MG capsule; Take 2 capsules by mouth 2 times daily for 14 days  Dispense: 56 capsule; Refill: 0  - clarithromycin (BIAXIN) 500 MG tablet; Take 1 tablet by mouth 2 times daily for 14 days  Dispense: 28 tablet; Refill: 0    2. Acquired hypothyroidism  - levothyroxine (SYNTHROID) 112 MCG tablet; Take 1 tablet by mouth daily  Dispense: 30 tablet;  Refill: 5

## 2023-10-12 ENCOUNTER — OFFICE VISIT (OUTPATIENT)
Age: 78
End: 2023-10-12

## 2023-10-12 VITALS
OXYGEN SATURATION: 95 % | BODY MASS INDEX: 34.9 KG/M2 | TEMPERATURE: 98.2 F | SYSTOLIC BLOOD PRESSURE: 115 MMHG | DIASTOLIC BLOOD PRESSURE: 74 MMHG | WEIGHT: 178 LBS | HEART RATE: 78 BPM

## 2023-10-12 DIAGNOSIS — J34.89 NASAL CONGESTION WITH RHINORRHEA: ICD-10-CM

## 2023-10-12 DIAGNOSIS — R25.2 BILATERAL LEG CRAMPS: Primary | ICD-10-CM

## 2023-10-12 DIAGNOSIS — R09.81 NASAL CONGESTION WITH RHINORRHEA: ICD-10-CM

## 2023-10-12 DIAGNOSIS — L98.9 SKIN LESIONS: ICD-10-CM

## 2023-10-12 PROCEDURE — 99214 OFFICE O/P EST MOD 30 MIN: CPT | Performed by: FAMILY MEDICINE

## 2023-10-12 PROCEDURE — 1123F ACP DISCUSS/DSCN MKR DOCD: CPT | Performed by: FAMILY MEDICINE

## 2023-10-12 RX ORDER — AMITRIPTYLINE HYDROCHLORIDE 10 MG/1
10 TABLET, FILM COATED ORAL NIGHTLY
Qty: 30 TABLET | Refills: 1 | Status: SHIPPED | OUTPATIENT
Start: 2023-10-12

## 2023-10-12 RX ORDER — CLOBETASOL PROPIONATE 0.5 MG/G
OINTMENT TOPICAL
Qty: 30 G | Refills: 1 | Status: SHIPPED | OUTPATIENT
Start: 2023-10-12

## 2023-10-12 SDOH — ECONOMIC STABILITY: INCOME INSECURITY: HOW HARD IS IT FOR YOU TO PAY FOR THE VERY BASICS LIKE FOOD, HOUSING, MEDICAL CARE, AND HEATING?: NOT VERY HARD

## 2023-10-12 SDOH — ECONOMIC STABILITY: FOOD INSECURITY: WITHIN THE PAST 12 MONTHS, YOU WORRIED THAT YOUR FOOD WOULD RUN OUT BEFORE YOU GOT MONEY TO BUY MORE.: NEVER TRUE

## 2023-10-12 SDOH — ECONOMIC STABILITY: HOUSING INSECURITY
IN THE LAST 12 MONTHS, WAS THERE A TIME WHEN YOU DID NOT HAVE A STEADY PLACE TO SLEEP OR SLEPT IN A SHELTER (INCLUDING NOW)?: NO

## 2023-10-12 SDOH — ECONOMIC STABILITY: FOOD INSECURITY: WITHIN THE PAST 12 MONTHS, THE FOOD YOU BOUGHT JUST DIDN'T LAST AND YOU DIDN'T HAVE MONEY TO GET MORE.: NEVER TRUE

## 2023-10-12 ASSESSMENT — PATIENT HEALTH QUESTIONNAIRE - PHQ9
SUM OF ALL RESPONSES TO PHQ QUESTIONS 1-9: 0
2. FEELING DOWN, DEPRESSED OR HOPELESS: 0
SUM OF ALL RESPONSES TO PHQ QUESTIONS 1-9: 0
SUM OF ALL RESPONSES TO PHQ QUESTIONS 1-9: 0
SUM OF ALL RESPONSES TO PHQ9 QUESTIONS 1 & 2: 0
SUM OF ALL RESPONSES TO PHQ QUESTIONS 1-9: 0
1. LITTLE INTEREST OR PLEASURE IN DOING THINGS: 0

## 2023-10-12 ASSESSMENT — ENCOUNTER SYMPTOMS
DIARRHEA: 0
CONSTIPATION: 0
NAUSEA: 0
ABDOMINAL PAIN: 0
COUGH: 0
SHORTNESS OF BREATH: 0
RHINORRHEA: 1
SORE THROAT: 0

## 2023-10-12 ASSESSMENT — LIFESTYLE VARIABLES
HOW OFTEN DO YOU HAVE A DRINK CONTAINING ALCOHOL: NEVER
HOW MANY STANDARD DRINKS CONTAINING ALCOHOL DO YOU HAVE ON A TYPICAL DAY: PATIENT DOES NOT DRINK

## 2023-10-12 NOTE — PROGRESS NOTES
[facility]:  [id number]. Amadou Guaman LPN    Patient name and date of birth verified by . Patient given an after visit summary, reviewed medications on how to take/ use  and when ; coupons given to present to pharmacy for medication discount. Advised patient to schedule next appointment before leaving clinic office site. Also reviewed in summary the use of the nellie med sinus kit that can be purchased over the counter. Time given for any questions, patient verbalized understanding of all information given at time of visit.   Amadou Guaman LPN

## 2023-12-05 ENCOUNTER — HOSPITAL ENCOUNTER (OUTPATIENT)
Facility: HOSPITAL | Age: 78
Setting detail: SPECIMEN
Discharge: HOME | End: 2023-12-08

## 2023-12-05 ENCOUNTER — OFFICE VISIT (OUTPATIENT)
Age: 78
End: 2023-12-05

## 2023-12-05 VITALS
HEART RATE: 71 BPM | OXYGEN SATURATION: 95 % | SYSTOLIC BLOOD PRESSURE: 121 MMHG | WEIGHT: 178 LBS | TEMPERATURE: 97.7 F | BODY MASS INDEX: 34.9 KG/M2 | DIASTOLIC BLOOD PRESSURE: 71 MMHG

## 2023-12-05 DIAGNOSIS — R42 DIZZINESS: Primary | ICD-10-CM

## 2023-12-05 DIAGNOSIS — R42 DIZZINESS: ICD-10-CM

## 2023-12-05 PROCEDURE — 36415 COLL VENOUS BLD VENIPUNCTURE: CPT

## 2023-12-05 PROCEDURE — 80053 COMPREHEN METABOLIC PANEL: CPT

## 2023-12-05 PROCEDURE — 99214 OFFICE O/P EST MOD 30 MIN: CPT | Performed by: FAMILY MEDICINE

## 2023-12-05 PROCEDURE — 85025 COMPLETE CBC W/AUTO DIFF WBC: CPT

## 2023-12-05 PROCEDURE — 1123F ACP DISCUSS/DSCN MKR DOCD: CPT | Performed by: FAMILY MEDICINE

## 2023-12-05 PROCEDURE — 84443 ASSAY THYROID STIM HORMONE: CPT

## 2023-12-05 RX ORDER — ONDANSETRON 4 MG/1
4 TABLET, ORALLY DISINTEGRATING ORAL 3 TIMES DAILY PRN
Qty: 30 TABLET | Refills: 0 | Status: SHIPPED | OUTPATIENT
Start: 2023-12-05

## 2023-12-05 ASSESSMENT — ENCOUNTER SYMPTOMS
ABDOMINAL PAIN: 0
CONSTIPATION: 0
NAUSEA: 1
SHORTNESS OF BREATH: 0
COUGH: 0
DIARRHEA: 0

## 2023-12-05 ASSESSMENT — PATIENT HEALTH QUESTIONNAIRE - PHQ9
SUM OF ALL RESPONSES TO PHQ QUESTIONS 1-9: 0
SUM OF ALL RESPONSES TO PHQ QUESTIONS 1-9: 0
1. LITTLE INTEREST OR PLEASURE IN DOING THINGS: 0
SUM OF ALL RESPONSES TO PHQ QUESTIONS 1-9: 0
SUM OF ALL RESPONSES TO PHQ QUESTIONS 1-9: 0
2. FEELING DOWN, DEPRESSED OR HOPELESS: 0
SUM OF ALL RESPONSES TO PHQ9 QUESTIONS 1 & 2: 0

## 2023-12-05 NOTE — PROGRESS NOTES
Patient discharged with AVS. Patient's name and  verified. Patient made aware of prescriptions sent to the pharmacy. Medication review completed. Coupon given for the pharmacy. Patient assisted in scheduling an ordered ultrasound. Vertigo exercises included in AVS reviewed. Patient instructed on when to schedule next appointment. Patient given an opportunity to voice questions/concerns. All questions addressed. [facility] interpretor #[id number] assisted.

## 2023-12-06 LAB
ALBUMIN SERPL-MCNC: 3.7 G/DL (ref 3.5–5)
ALBUMIN/GLOB SERPL: 1.2 (ref 1.1–2.2)
ALP SERPL-CCNC: 79 U/L (ref 45–117)
ALT SERPL-CCNC: 21 U/L (ref 12–78)
ANION GAP SERPL CALC-SCNC: 7 MMOL/L (ref 5–15)
AST SERPL-CCNC: 11 U/L (ref 15–37)
BASOPHILS # BLD: 0 K/UL (ref 0–0.1)
BASOPHILS NFR BLD: 0 % (ref 0–1)
BILIRUB SERPL-MCNC: 0.3 MG/DL (ref 0.2–1)
BUN SERPL-MCNC: 24 MG/DL (ref 6–20)
BUN/CREAT SERPL: 30 (ref 12–20)
CALCIUM SERPL-MCNC: 8.7 MG/DL (ref 8.5–10.1)
CHLORIDE SERPL-SCNC: 109 MMOL/L (ref 97–108)
CO2 SERPL-SCNC: 26 MMOL/L (ref 21–32)
CREAT SERPL-MCNC: 0.79 MG/DL (ref 0.55–1.02)
DIFFERENTIAL METHOD BLD: NORMAL
EOSINOPHIL # BLD: 0.1 K/UL (ref 0–0.4)
EOSINOPHIL NFR BLD: 2 % (ref 0–7)
ERYTHROCYTE [DISTWIDTH] IN BLOOD BY AUTOMATED COUNT: 12.9 % (ref 11.5–14.5)
GLOBULIN SER CALC-MCNC: 3.1 G/DL (ref 2–4)
GLUCOSE SERPL-MCNC: 96 MG/DL (ref 65–100)
HCT VFR BLD AUTO: 37.5 % (ref 35–47)
HGB BLD-MCNC: 12 G/DL (ref 11.5–16)
IMM GRANULOCYTES # BLD AUTO: 0 K/UL
IMM GRANULOCYTES NFR BLD AUTO: 0 %
LYMPHOCYTES # BLD: 2.9 K/UL (ref 0.8–3.5)
LYMPHOCYTES NFR BLD: 40 % (ref 12–49)
MCH RBC QN AUTO: 29.1 PG (ref 26–34)
MCHC RBC AUTO-ENTMCNC: 32 G/DL (ref 30–36.5)
MCV RBC AUTO: 90.8 FL (ref 80–99)
MONOCYTES # BLD: 0.4 K/UL (ref 0–1)
MONOCYTES NFR BLD: 5 % (ref 5–13)
NEUTS SEG # BLD: 3.8 K/UL (ref 1.8–8)
NEUTS SEG NFR BLD: 53 % (ref 32–75)
NRBC # BLD: 0 K/UL (ref 0–0.01)
NRBC BLD-RTO: 0 PER 100 WBC
PLATELET # BLD AUTO: 229 K/UL (ref 150–400)
PMV BLD AUTO: 11.1 FL (ref 8.9–12.9)
POTASSIUM SERPL-SCNC: 4.3 MMOL/L (ref 3.5–5.1)
PROT SERPL-MCNC: 6.8 G/DL (ref 6.4–8.2)
RBC # BLD AUTO: 4.13 M/UL (ref 3.8–5.2)
RBC MORPH BLD: NORMAL
SODIUM SERPL-SCNC: 142 MMOL/L (ref 136–145)
TSH SERPL DL<=0.05 MIU/L-ACNC: 0.98 UIU/ML (ref 0.36–3.74)
WBC # BLD AUTO: 7.2 K/UL (ref 3.6–11)

## 2023-12-06 NOTE — RESULT ENCOUNTER NOTE
Please let the pt know her labs were all normal --  She's not anemic. Her blood sugar is normal  Her electrolytes are normal.  Liver, kidney and thyroid labs are normal.    She has a carotid ultrasound coming up.

## 2024-01-25 NOTE — PROGRESS NOTES
Sosa Yeager is a 78 y.o. female   Chief Complaint   Patient presents with   • Dizziness     F/up; patient states symptoms of dizziness and nausea have passed; she is requested lab result review         ASSESSMENT AND PLAN:    1. Hypothyroidism, unspecified type  Stable/controlled on levothyroxine 112mcg.  Lab visit in 3 months for repeat TSH.  Followup with me in 6 months.    SUBJECTIVE:    HPI:  Sosa Yeager is a 78 y.o. female   Followup dizziness.  Last visit 12/5. Given zofran for nausea.  Normal carotid ultrasound. Normal CMP, CBC, TSH.  On synthroid 112mcg.  -----    Her dizziness and nausea have resolved. She has been doing well.  She is very conscious about her health and strict about her diet.  She doesn't like water, but she'll drink lemon water.    She doesn't drink coffee, soda, juice, alcohol.  She doesn't eat sweets.  She doesn't eat fried foods.  She believes your health is only as good as what you eat.    She has no specific concerns today.    She declines immunizations because she states she never leaves the house and she is very careful about taking care of herself so she is not worried.    Review of Systems   Constitutional:  Negative for fatigue, fever and unexpected weight change.   Eyes:  Negative for visual disturbance.   Respiratory:  Negative for cough and shortness of breath.    Cardiovascular:  Negative for chest pain and palpitations.   Gastrointestinal:  Negative for abdominal pain, constipation, diarrhea and nausea.   Neurological:  Negative for dizziness and headaches.       /69 (Site: Right Upper Arm, Position: Sitting)   Pulse 72   Temp 98.1 °F (36.7 °C) (Temporal)   Resp 20   Wt 82.5 kg (181 lb 12.8 oz)   SpO2 93%   BMI 35.65 kg/m²     Physical Exam  Constitutional:       General: She is not in acute distress.     Appearance: Normal appearance.   HENT:      Mouth/Throat:      Mouth: Mucous membranes are moist.      Pharynx: Oropharynx is clear. No

## 2024-01-26 ENCOUNTER — OFFICE VISIT (OUTPATIENT)
Age: 79
End: 2024-01-26

## 2024-01-26 VITALS
SYSTOLIC BLOOD PRESSURE: 112 MMHG | OXYGEN SATURATION: 93 % | DIASTOLIC BLOOD PRESSURE: 69 MMHG | HEART RATE: 72 BPM | RESPIRATION RATE: 20 BRPM | TEMPERATURE: 98.1 F | BODY MASS INDEX: 35.65 KG/M2 | WEIGHT: 181.8 LBS

## 2024-01-26 DIAGNOSIS — Z71.89 COUNSELING AND COORDINATION OF CARE: Primary | ICD-10-CM

## 2024-01-26 DIAGNOSIS — E03.9 HYPOTHYROIDISM, UNSPECIFIED TYPE: Primary | ICD-10-CM

## 2024-01-26 SDOH — ECONOMIC STABILITY: HOUSING INSECURITY: IN THE LAST 12 MONTHS, HOW MANY PLACES HAVE YOU LIVED?: 1

## 2024-01-26 SDOH — ECONOMIC STABILITY: INCOME INSECURITY: IN THE LAST 12 MONTHS, WAS THERE A TIME WHEN YOU WERE NOT ABLE TO PAY THE MORTGAGE OR RENT ON TIME?: NO

## 2024-01-26 SDOH — ECONOMIC STABILITY: FOOD INSECURITY: WITHIN THE PAST 12 MONTHS, YOU WORRIED THAT YOUR FOOD WOULD RUN OUT BEFORE YOU GOT MONEY TO BUY MORE.: NEVER TRUE

## 2024-01-26 SDOH — ECONOMIC STABILITY: FOOD INSECURITY: WITHIN THE PAST 12 MONTHS, THE FOOD YOU BOUGHT JUST DIDN'T LAST AND YOU DIDN'T HAVE MONEY TO GET MORE.: NEVER TRUE

## 2024-01-26 ASSESSMENT — SOCIAL DETERMINANTS OF HEALTH (SDOH)

## 2024-01-26 ASSESSMENT — PATIENT HEALTH QUESTIONNAIRE - PHQ9
SUM OF ALL RESPONSES TO PHQ QUESTIONS 1-9: 0
SUM OF ALL RESPONSES TO PHQ QUESTIONS 1-9: 0
2. FEELING DOWN, DEPRESSED OR HOPELESS: 0
SUM OF ALL RESPONSES TO PHQ QUESTIONS 1-9: 0
SUM OF ALL RESPONSES TO PHQ9 QUESTIONS 1 & 2: 0
SUM OF ALL RESPONSES TO PHQ QUESTIONS 1-9: 0
1. LITTLE INTEREST OR PLEASURE IN DOING THINGS: 0

## 2024-01-26 ASSESSMENT — ENCOUNTER SYMPTOMS
COUGH: 0
NAUSEA: 0
DIARRHEA: 0
CONSTIPATION: 0
SHORTNESS OF BREATH: 0
ABDOMINAL PAIN: 0

## 2024-01-26 ASSESSMENT — LIFESTYLE VARIABLES
HOW MANY STANDARD DRINKS CONTAINING ALCOHOL DO YOU HAVE ON A TYPICAL DAY: PATIENT DOES NOT DRINK
HOW OFTEN DO YOU HAVE A DRINK CONTAINING ALCOHOL: NEVER

## 2024-01-26 NOTE — PROGRESS NOTES
Chief Complaint   Patient presents with    Dizziness     F/up; patient states symptoms of dizziness and nausea have passed; she is requested lab result review     /69 (Site: Right Upper Arm, Position: Sitting)   Pulse 72   Temp 98.1 °F (36.7 °C) (Temporal)   Resp 20   Wt 82.5 kg (181 lb 12.8 oz)   SpO2 93%   BMI 35.65 kg/m²     Coordination of Care  1. Have you been to the ER, urgent care clinic since your last visit?  Hospitalized since your last visit? No    2. Have you seen or consulted any other health care providers outside of the [facility] since your last visit?  Include any pap smears or colon screening. No  Does the patient need refills?No    Learning Assessment Complete? yes  Depression Screening complete in the past 12 months? yes

## 2024-01-26 NOTE — PROGRESS NOTES
Sosa Yeager seen at d/c, full name and  verified. After Visit Summary provided and all discharge instructions reviewed. Pt to return for follow up appt in about 6 months for hypothyroidism. Instructed pt to schedule a labs only appt in 3 months to check TSH.  Medication list and possible side effects reviewed. Teach back method utilized to ensure patient accurately understands how to and when to take prescribed medication. Pt made aware that provider has sent a 3 months supple of Synthroid to her preferred pharmacy. Opportunity for questions provided, patient denies any questions. Oksana Mcgowan RN

## 2024-01-26 NOTE — PROGRESS NOTES
Assisted patient with medical bills. [facility] FA application was filled out. Patient took it with her. Pending Support Letter. Patient's child, Cyndie South, will call [facility] to schedule new appt to complete application.   [facility] assisted patient to complete [facility] screening.

## 2024-02-29 ENCOUNTER — OFFICE VISIT (OUTPATIENT)
Age: 79
End: 2024-02-29

## 2024-02-29 DIAGNOSIS — Z71.89 COUNSELING AND COORDINATION OF CARE: Primary | ICD-10-CM

## 2024-02-29 PROCEDURE — 99080 SPECIAL REPORTS OR FORMS: CPT | Performed by: PHYSICIAN ASSISTANT

## 2024-02-29 NOTE — PROGRESS NOTES
BS Financial Assistance application has been completed.   Patient will bring it to a Financial Counselor at the hospital.    Patient already complete Medicaid screening.

## 2024-03-22 DIAGNOSIS — E03.9 ACQUIRED HYPOTHYROIDISM: ICD-10-CM

## 2024-03-22 RX ORDER — LEVOTHYROXINE SODIUM 112 UG/1
112 TABLET ORAL DAILY
Qty: 30 TABLET | Refills: 0 | Status: SHIPPED | OUTPATIENT
Start: 2024-03-22

## 2024-04-20 DIAGNOSIS — E03.9 ACQUIRED HYPOTHYROIDISM: ICD-10-CM

## 2024-04-23 RX ORDER — LEVOTHYROXINE SODIUM 112 UG/1
112 TABLET ORAL DAILY
Qty: 30 TABLET | Refills: 0 | Status: SHIPPED | OUTPATIENT
Start: 2024-04-23

## 2024-05-14 ENCOUNTER — HOSPITAL ENCOUNTER (OUTPATIENT)
Facility: HOSPITAL | Age: 79
Setting detail: SPECIMEN
Discharge: HOME | End: 2024-05-17

## 2024-05-14 ENCOUNTER — NURSE ONLY (OUTPATIENT)
Age: 79
End: 2024-05-14

## 2024-05-14 DIAGNOSIS — E03.9 ACQUIRED HYPOTHYROIDISM: ICD-10-CM

## 2024-05-14 DIAGNOSIS — E03.9 ACQUIRED HYPOTHYROIDISM: Primary | ICD-10-CM

## 2024-05-14 LAB — TSH SERPL DL<=0.05 MIU/L-ACNC: 2.22 UIU/ML (ref 0.36–3.74)

## 2024-05-14 PROCEDURE — 84443 ASSAY THYROID STIM HORMONE: CPT

## 2024-05-14 PROCEDURE — 36415 COLL VENOUS BLD VENIPUNCTURE: CPT

## 2024-05-14 NOTE — PROGRESS NOTES
Patient presented to clinic for lab only appt. Name and  verified. Labs collected by Oksana MCGRATH: TSH   Patient tolerated well. Liz PintoCMA

## 2024-05-16 NOTE — RESULT ENCOUNTER NOTE
Please let the pt know that her thyroid remains in the normal range and I've sent refills to her pharmacy of the levothyroxine. She should be following up in 3 months.

## 2024-05-17 ENCOUNTER — TELEPHONE (OUTPATIENT)
Age: 79
End: 2024-05-17

## 2024-05-17 DIAGNOSIS — E03.9 ACQUIRED HYPOTHYROIDISM: ICD-10-CM

## 2024-05-17 RX ORDER — LEVOTHYROXINE SODIUM 112 UG/1
112 TABLET ORAL DAILY
Qty: 90 TABLET | Refills: 1 | Status: SHIPPED | OUTPATIENT
Start: 2024-05-17

## 2024-05-17 NOTE — TELEPHONE ENCOUNTER
Phone call made to patient, spoke with pt's daughter Cyndie South, on PHI. Pt's name and  verified. Lab results were reviewed with Cyndie per provider's note (see below).    Informed pt's daughter of pt's medication refills sent to [facility]. Cyndie requests change of pharmacy to [facility] on , change made and refills of levothyroxine resent to this pharmacy.  Reviewed medication information as well as how/ when pt should take med (levothyroxine (SYNTHROID) 112 MCG tablet: take 1 tablet by mouth daily, before breakfast. Coupon card was sent to pt via text. Cyndie verbalizes understanding and denies any questions.     ----- Message from Radha Durand MD sent at 2024  4:21 PM EDT -----    Please let the pt know that her thyroid remains in the normal range and I've sent refills to her pharmacy of the levothyroxine. She should be following up in 3 months.

## 2024-10-28 ENCOUNTER — HOSPITAL ENCOUNTER (OUTPATIENT)
Facility: HOSPITAL | Age: 79
Setting detail: SPECIMEN
Discharge: HOME | End: 2024-10-31

## 2024-10-28 ENCOUNTER — OFFICE VISIT (OUTPATIENT)
Age: 79
End: 2024-10-28

## 2024-10-28 VITALS
SYSTOLIC BLOOD PRESSURE: 136 MMHG | OXYGEN SATURATION: 94 % | HEIGHT: 60 IN | BODY MASS INDEX: 34.16 KG/M2 | TEMPERATURE: 98.6 F | WEIGHT: 174 LBS | DIASTOLIC BLOOD PRESSURE: 79 MMHG | HEART RATE: 73 BPM

## 2024-10-28 DIAGNOSIS — Z13.9 SCREENING DUE: ICD-10-CM

## 2024-10-28 DIAGNOSIS — L81.4 SKIN SPOTS-AGING: ICD-10-CM

## 2024-10-28 DIAGNOSIS — E03.9 ACQUIRED HYPOTHYROIDISM: ICD-10-CM

## 2024-10-28 DIAGNOSIS — M25.531 CHRONIC PAIN OF RIGHT WRIST: Primary | ICD-10-CM

## 2024-10-28 DIAGNOSIS — R60.0 PERIPHERAL EDEMA: ICD-10-CM

## 2024-10-28 DIAGNOSIS — G89.29 CHRONIC PAIN OF RIGHT WRIST: Primary | ICD-10-CM

## 2024-10-28 PROCEDURE — 83036 HEMOGLOBIN GLYCOSYLATED A1C: CPT

## 2024-10-28 PROCEDURE — 85025 COMPLETE CBC W/AUTO DIFF WBC: CPT

## 2024-10-28 PROCEDURE — 80053 COMPREHEN METABOLIC PANEL: CPT

## 2024-10-28 PROCEDURE — 99214 OFFICE O/P EST MOD 30 MIN: CPT | Performed by: FAMILY MEDICINE

## 2024-10-28 PROCEDURE — 84443 ASSAY THYROID STIM HORMONE: CPT

## 2024-10-28 PROCEDURE — 1123F ACP DISCUSS/DSCN MKR DOCD: CPT | Performed by: FAMILY MEDICINE

## 2024-10-28 PROCEDURE — 80061 LIPID PANEL: CPT

## 2024-10-28 RX ORDER — MAGNESIUM OXIDE 400 MG/1
400 TABLET ORAL
Qty: 90 TABLET | Refills: 1 | Status: SHIPPED | OUTPATIENT
Start: 2024-10-28

## 2024-10-28 SDOH — ECONOMIC STABILITY: FOOD INSECURITY: WITHIN THE PAST 12 MONTHS, THE FOOD YOU BOUGHT JUST DIDN'T LAST AND YOU DIDN'T HAVE MONEY TO GET MORE.: NEVER TRUE

## 2024-10-28 SDOH — ECONOMIC STABILITY: FOOD INSECURITY: WITHIN THE PAST 12 MONTHS, YOU WORRIED THAT YOUR FOOD WOULD RUN OUT BEFORE YOU GOT MONEY TO BUY MORE.: NEVER TRUE

## 2024-10-28 SDOH — ECONOMIC STABILITY: INCOME INSECURITY: HOW HARD IS IT FOR YOU TO PAY FOR THE VERY BASICS LIKE FOOD, HOUSING, MEDICAL CARE, AND HEATING?: NOT VERY HARD

## 2024-10-28 ASSESSMENT — ENCOUNTER SYMPTOMS
COUGH: 0
CONSTIPATION: 0
ABDOMINAL PAIN: 0
NAUSEA: 0
SHORTNESS OF BREATH: 0
DIARRHEA: 0

## 2024-10-28 ASSESSMENT — PATIENT HEALTH QUESTIONNAIRE - PHQ9
SUM OF ALL RESPONSES TO PHQ QUESTIONS 1-9: 0
SUM OF ALL RESPONSES TO PHQ QUESTIONS 1-9: 0
1. LITTLE INTEREST OR PLEASURE IN DOING THINGS: NOT AT ALL
SUM OF ALL RESPONSES TO PHQ9 QUESTIONS 1 & 2: 0
SUM OF ALL RESPONSES TO PHQ QUESTIONS 1-9: 0
SUM OF ALL RESPONSES TO PHQ QUESTIONS 1-9: 0
2. FEELING DOWN, DEPRESSED OR HOPELESS: NOT AT ALL

## 2024-10-28 NOTE — PROGRESS NOTES
Sosa Yeager is a 78 y.o. female   Chief Complaint   Patient presents with   • Thyroid Problem     Follow up   • Foot Swelling     X 6 months,hand pain x 1 week         ASSESSMENT AND PLAN:    1. Chronic pain of right wrist  Along CMC to 2nd MCP.   Topical diclofenac + acupuncture.  - diclofenac sodium (VOLTAREN) 1 % GEL; Apply 2 g topically 4 times daily  Dispense: 100 g; Refill: 3    2. Acquired hypothyroidism  Adjust levothyroxine as indicated  - TSH; Future    3. Peripheral edema  Compression socks.  Elevate feet when seated.  Acupuncture.  - magnesium oxide (MAG-OX) 400 MG tablet; Take 1 tablet by mouth nightly  Dispense: 90 tablet; Refill: 1    4. Screening due  - CBC with Auto Differential; Future  - Comprehensive Metabolic Panel; Future  - Hemoglobin A1C; Future  - Lipid Panel; Future    5. Skin spots-aging  Discussed.        SUBJECTIVE:    HPI:  Sosa Yeager is a 78 y.o. female who presents  She has been having pain of her right hand and swelling and pain in bilateral feet/ ankles.  No SOB or dry cough.  Swelling is constant.  She is walking a lot. She gets a lot of cramps in her legs.  Would prefer to avoid pills if possible.  Declines imms (offered flu, pcv20 and rsv)    Review of Systems   Constitutional:  Negative for fatigue, fever and unexpected weight change.   Eyes:  Negative for visual disturbance.   Respiratory:  Negative for cough and shortness of breath.    Cardiovascular:  Positive for leg swelling. Negative for chest pain and palpitations.   Gastrointestinal:  Negative for abdominal pain, constipation, diarrhea and nausea.   Musculoskeletal:  Positive for arthralgias.   Neurological:  Negative for dizziness and headaches.         /79 (Site: Left Upper Arm, Position: Sitting, Cuff Size: Large Adult)   Pulse 73   Temp 98.6 °F (37 °C) (Temporal)   Ht 1.52 m (4' 11.84\")   Wt 78.9 kg (174 lb)   SpO2 94%   BMI 34.16 kg/m²     Physical Exam  Constitutional:

## 2024-10-28 NOTE — PROGRESS NOTES
Pt's name and  verified. AVS provided. Medication reviewed and education provided. Good rx coupons provided and instructed on use. Pt instructed to schedule follow up in 3 months per provider. Compression sock wearing information provided and encouraged per provider. Pt verbalizes understanding. Time allowed for questions, all questions answered. Malaika Sifuentes RN

## 2024-10-29 LAB
ALBUMIN SERPL-MCNC: 3.8 G/DL (ref 3.5–5)
ALBUMIN/GLOB SERPL: 1.3 (ref 1.1–2.2)
ALP SERPL-CCNC: 91 U/L (ref 45–117)
ALT SERPL-CCNC: 24 U/L (ref 12–78)
ANION GAP SERPL CALC-SCNC: 6 MMOL/L (ref 2–12)
AST SERPL-CCNC: 17 U/L (ref 15–37)
BASOPHILS # BLD: 0.1 K/UL (ref 0–0.1)
BASOPHILS NFR BLD: 1 % (ref 0–1)
BILIRUB SERPL-MCNC: 0.4 MG/DL (ref 0.2–1)
BUN SERPL-MCNC: 24 MG/DL (ref 6–20)
BUN/CREAT SERPL: 32 (ref 12–20)
CALCIUM SERPL-MCNC: 8.7 MG/DL (ref 8.5–10.1)
CHLORIDE SERPL-SCNC: 107 MMOL/L (ref 97–108)
CHOLEST SERPL-MCNC: 282 MG/DL
CO2 SERPL-SCNC: 28 MMOL/L (ref 21–32)
CREAT SERPL-MCNC: 0.75 MG/DL (ref 0.55–1.02)
DIFFERENTIAL METHOD BLD: NORMAL
EOSINOPHIL # BLD: 0.3 K/UL (ref 0–0.4)
EOSINOPHIL NFR BLD: 4 % (ref 0–7)
ERYTHROCYTE [DISTWIDTH] IN BLOOD BY AUTOMATED COUNT: 12.8 % (ref 11.5–14.5)
EST. AVERAGE GLUCOSE BLD GHB EST-MCNC: 111 MG/DL
GLOBULIN SER CALC-MCNC: 3 G/DL (ref 2–4)
GLUCOSE SERPL-MCNC: 94 MG/DL (ref 65–100)
HBA1C MFR BLD: 5.5 % (ref 4–5.6)
HCT VFR BLD AUTO: 37.6 % (ref 35–47)
HDLC SERPL-MCNC: 46 MG/DL
HDLC SERPL: 6.1 (ref 0–5)
HGB BLD-MCNC: 12 G/DL (ref 11.5–16)
IMM GRANULOCYTES # BLD AUTO: 0 K/UL
IMM GRANULOCYTES NFR BLD AUTO: 0 %
LDLC SERPL CALC-MCNC: 198 MG/DL (ref 0–100)
LYMPHOCYTES # BLD: 2.9 K/UL (ref 0.8–3.5)
LYMPHOCYTES NFR BLD: 41 % (ref 12–49)
MCH RBC QN AUTO: 29.3 PG (ref 26–34)
MCHC RBC AUTO-ENTMCNC: 31.9 G/DL (ref 30–36.5)
MCV RBC AUTO: 91.7 FL (ref 80–99)
MONOCYTES # BLD: 0.4 K/UL (ref 0–1)
MONOCYTES NFR BLD: 5 % (ref 5–13)
NEUTS BAND NFR BLD MANUAL: 1 % (ref 0–6)
NEUTS SEG # BLD: 3.3 K/UL (ref 1.8–8)
NEUTS SEG NFR BLD: 48 % (ref 32–75)
NRBC # BLD: 0 K/UL (ref 0–0.01)
NRBC BLD-RTO: 0 PER 100 WBC
PLATELET # BLD AUTO: 247 K/UL (ref 150–400)
PMV BLD AUTO: 11.2 FL (ref 8.9–12.9)
POTASSIUM SERPL-SCNC: 4.4 MMOL/L (ref 3.5–5.1)
PROT SERPL-MCNC: 6.8 G/DL (ref 6.4–8.2)
RBC # BLD AUTO: 4.1 M/UL (ref 3.8–5.2)
RBC MORPH BLD: NORMAL
SODIUM SERPL-SCNC: 141 MMOL/L (ref 136–145)
TRIGL SERPL-MCNC: 190 MG/DL
TSH SERPL DL<=0.05 MIU/L-ACNC: 0.62 UIU/ML (ref 0.36–3.74)
VLDLC SERPL CALC-MCNC: 38 MG/DL
WBC # BLD AUTO: 7 K/UL (ref 3.6–11)
WBC MORPH BLD: NORMAL

## 2024-10-30 DIAGNOSIS — E03.9 ACQUIRED HYPOTHYROIDISM: ICD-10-CM

## 2024-10-30 RX ORDER — LEVOTHYROXINE SODIUM 112 UG/1
112 TABLET ORAL DAILY
Qty: 90 TABLET | Refills: 1 | Status: SHIPPED | OUTPATIENT
Start: 2024-10-30

## 2024-10-30 NOTE — RESULT ENCOUNTER NOTE
Please let the pt know that her cholesterol is quite high which puts her at risk for a cardiovascular event. She can start a medication to lower it and/or watch her diet.  Otherwise her labs are normal.  Normal blood sugar.  She is not anemic.  Normal blood cells.  Normal liver and kidney labs.  Her thyroid is in normal range so she can continue levothyroxinee 122mcg.

## 2024-11-06 DIAGNOSIS — E78.2 MIXED HYPERLIPIDEMIA: Primary | ICD-10-CM

## 2024-11-06 RX ORDER — ATORVASTATIN CALCIUM 40 MG/1
40 TABLET, FILM COATED ORAL DAILY
Qty: 90 TABLET | Refills: 1 | Status: SHIPPED | OUTPATIENT
Start: 2024-11-06

## 2024-12-09 ENCOUNTER — OFFICE VISIT (OUTPATIENT)
Age: 79
End: 2024-12-09

## 2024-12-09 VITALS
WEIGHT: 173 LBS | OXYGEN SATURATION: 98 % | HEIGHT: 59 IN | BODY MASS INDEX: 34.88 KG/M2 | DIASTOLIC BLOOD PRESSURE: 52 MMHG | HEART RATE: 62 BPM | TEMPERATURE: 98 F | SYSTOLIC BLOOD PRESSURE: 132 MMHG

## 2024-12-09 DIAGNOSIS — Z76.89 ENCOUNTER FOR ACUPUNCTURE VISIT: Primary | ICD-10-CM

## 2024-12-09 DIAGNOSIS — H92.02 ACUTE PAIN OF LEFT EAR: ICD-10-CM

## 2024-12-09 DIAGNOSIS — M25.531 CHRONIC PAIN OF RIGHT WRIST: ICD-10-CM

## 2024-12-09 DIAGNOSIS — G89.29 CHRONIC PAIN OF RIGHT WRIST: ICD-10-CM

## 2024-12-09 PROCEDURE — 97813 ACUP 1/> W/ESTIM 1ST 15 MIN: CPT | Performed by: FAMILY MEDICINE

## 2024-12-09 PROCEDURE — 1123F ACP DISCUSS/DSCN MKR DOCD: CPT | Performed by: FAMILY MEDICINE

## 2024-12-09 PROCEDURE — 99215 OFFICE O/P EST HI 40 MIN: CPT | Performed by: FAMILY MEDICINE

## 2024-12-09 RX ORDER — ACETIC ACID 20.65 MG/ML
4 SOLUTION AURICULAR (OTIC) 3 TIMES DAILY
Qty: 1 EACH | Refills: 0 | Status: SHIPPED | OUTPATIENT
Start: 2024-12-09 | End: 2024-12-16

## 2024-12-09 SDOH — ECONOMIC STABILITY: INCOME INSECURITY: HOW HARD IS IT FOR YOU TO PAY FOR THE VERY BASICS LIKE FOOD, HOUSING, MEDICAL CARE, AND HEATING?: NOT HARD AT ALL

## 2024-12-09 SDOH — SOCIAL STABILITY: SOCIAL INSECURITY: WITHIN THE LAST YEAR, HAVE YOU BEEN HUMILIATED OR EMOTIONALLY ABUSED IN OTHER WAYS BY YOUR PARTNER OR EX-PARTNER?: NO

## 2024-12-09 SDOH — SOCIAL STABILITY: SOCIAL NETWORK: HOW OFTEN DO YOU GET TOGETHER WITH FRIENDS OR RELATIVES?: TWICE A WEEK

## 2024-12-09 SDOH — SOCIAL STABILITY: SOCIAL INSECURITY: WITHIN THE LAST YEAR, HAVE YOU BEEN AFRAID OF YOUR PARTNER OR EX-PARTNER?: NO

## 2024-12-09 SDOH — ECONOMIC STABILITY: TRANSPORTATION INSECURITY
IN THE PAST 12 MONTHS, HAS THE LACK OF TRANSPORTATION KEPT YOU FROM MEDICAL APPOINTMENTS OR FROM GETTING MEDICATIONS?: NO

## 2024-12-09 SDOH — SOCIAL STABILITY: SOCIAL INSECURITY
WITHIN THE LAST YEAR, HAVE TO BEEN RAPED OR FORCED TO HAVE ANY KIND OF SEXUAL ACTIVITY BY YOUR PARTNER OR EX-PARTNER?: NO

## 2024-12-09 SDOH — HEALTH STABILITY: MENTAL HEALTH
STRESS IS WHEN SOMEONE FEELS TENSE, NERVOUS, ANXIOUS, OR CAN'T SLEEP AT NIGHT BECAUSE THEIR MIND IS TROUBLED. HOW STRESSED ARE YOU?: NOT AT ALL

## 2024-12-09 SDOH — SOCIAL STABILITY: SOCIAL NETWORK
IN A TYPICAL WEEK, HOW MANY TIMES DO YOU TALK ON THE PHONE WITH FAMILY, FRIENDS, OR NEIGHBORS?: MORE THAN THREE TIMES A WEEK

## 2024-12-09 SDOH — SOCIAL STABILITY: SOCIAL NETWORK
DO YOU BELONG TO ANY CLUBS OR ORGANIZATIONS SUCH AS CHURCH GROUPS UNIONS, FRATERNAL OR ATHLETIC GROUPS, OR SCHOOL GROUPS?: NO

## 2024-12-09 SDOH — SOCIAL STABILITY: SOCIAL NETWORK: HOW OFTEN DO YOU ATTEND CHURCH OR RELIGIOUS SERVICES?: MORE THAN 4 TIMES PER YEAR

## 2024-12-09 SDOH — ECONOMIC STABILITY: FOOD INSECURITY: WITHIN THE PAST 12 MONTHS, THE FOOD YOU BOUGHT JUST DIDN'T LAST AND YOU DIDN'T HAVE MONEY TO GET MORE.: NEVER TRUE

## 2024-12-09 SDOH — HEALTH STABILITY: MENTAL HEALTH: HOW OFTEN DO YOU HAVE A DRINK CONTAINING ALCOHOL?: NEVER

## 2024-12-09 SDOH — ECONOMIC STABILITY: FOOD INSECURITY: WITHIN THE PAST 12 MONTHS, YOU WORRIED THAT YOUR FOOD WOULD RUN OUT BEFORE YOU GOT MONEY TO BUY MORE.: NEVER TRUE

## 2024-12-09 SDOH — ECONOMIC STABILITY: TRANSPORTATION INSECURITY
IN THE PAST 12 MONTHS, HAS LACK OF TRANSPORTATION KEPT YOU FROM MEETINGS, WORK, OR FROM GETTING THINGS NEEDED FOR DAILY LIVING?: NO

## 2024-12-09 SDOH — SOCIAL STABILITY: SOCIAL INSECURITY
WITHIN THE LAST YEAR, HAVE YOU BEEN KICKED, HIT, SLAPPED, OR OTHERWISE PHYSICALLY HURT BY YOUR PARTNER OR EX-PARTNER?: NO

## 2024-12-09 SDOH — SOCIAL STABILITY: SOCIAL NETWORK: HOW OFTEN DO YOU ATTENT MEETINGS OF THE CLUB OR ORGANIZATION YOU BELONG TO?: NEVER

## 2024-12-09 SDOH — HEALTH STABILITY: MENTAL HEALTH: HOW MANY STANDARD DRINKS CONTAINING ALCOHOL DO YOU HAVE ON A TYPICAL DAY?: PATIENT DOES NOT DRINK

## 2024-12-09 SDOH — SOCIAL STABILITY: SOCIAL NETWORK: ARE YOU MARRIED, WIDOWED, DIVORCED, SEPARATED, NEVER MARRIED, OR LIVING WITH A PARTNER?: MARRIED

## 2024-12-09 SDOH — ECONOMIC STABILITY: INCOME INSECURITY: IN THE LAST 12 MONTHS, WAS THERE A TIME WHEN YOU WERE NOT ABLE TO PAY THE MORTGAGE OR RENT ON TIME?: NO

## 2024-12-09 SDOH — HEALTH STABILITY: PHYSICAL HEALTH: ON AVERAGE, HOW MANY DAYS PER WEEK DO YOU ENGAGE IN MODERATE TO STRENUOUS EXERCISE (LIKE A BRISK WALK)?: 7 DAYS

## 2024-12-09 SDOH — HEALTH STABILITY: PHYSICAL HEALTH: ON AVERAGE, HOW MANY MINUTES DO YOU ENGAGE IN EXERCISE AT THIS LEVEL?: 60 MIN

## 2024-12-09 ASSESSMENT — PATIENT HEALTH QUESTIONNAIRE - PHQ9
SUM OF ALL RESPONSES TO PHQ QUESTIONS 1-9: 0
SUM OF ALL RESPONSES TO PHQ9 QUESTIONS 1 & 2: 0
SUM OF ALL RESPONSES TO PHQ QUESTIONS 1-9: 0
SUM OF ALL RESPONSES TO PHQ QUESTIONS 1-9: 0
2. FEELING DOWN, DEPRESSED OR HOPELESS: NOT AT ALL
1. LITTLE INTEREST OR PLEASURE IN DOING THINGS: NOT AT ALL
SUM OF ALL RESPONSES TO PHQ QUESTIONS 1-9: 0

## 2024-12-09 NOTE — PROGRESS NOTES
Patient discharged with the After Visit Summary. Patient's name and  verified. Patient made aware of the prescription sent to the pharmacy. Medication review completed. Pharmacy coupon given. Patient instructed to rest and hydrate for the remainder of the day per physician. Patient given an opportunity to voice questions/concerns. No questions/concerns at this time.  [facility]  assisted.

## 2024-12-09 NOTE — PROGRESS NOTES
Chief Complaint   Patient presents with    Acupuncture     BP (!) 132/52 (Site: Left Upper Arm, Position: Sitting, Cuff Size: Large Adult)   Pulse 62   Temp 98 °F (36.7 °C) (Temporal)   Ht 1.5 m (4' 11.06\")   Wt 78.5 kg (173 lb)   SpO2 98%   BMI 34.88 kg/m²   \"Have you been to the ER, urgent care clinic since your last visit?  Hospitalized since your last visit?\"    NO    “Have you seen or consulted any other health care providers outside our system since your last visit?”    NO

## 2024-12-09 NOTE — PROGRESS NOTES
ACUPUNCTURE VISIT    Sosa Yeager is a 79 y.o. female.    Acupuncture visit # 1  First visit for acupuncture for R wrist/hand pain.  Pain primarily over 1-3rd metacarpals.  Now starting on L.  She has trouble holding up a pan while cooking.  She notes that cold aggravates the pain.  She thinks going from cold to hot to cold to hot makes it worse.  She plans to get some gloves.    She also mentions that this morning she woke up with left ear pain. Her granddaughter didn't see any external abnormalities.  No fevers. No hearing loss. No discharge.       Symptoms to be addressed today:    ROS - see above; otherwise normal.    PHYSICAL EXAM -  Alert and Oriented x3, NAD  Normal respirations  Normal pulses  Nodularity to hand joints   Tenderness of MCP joints.  Left ear canal erythematous.     PROCEDURE NOTE-  Informed consent obtained.  Time out performed prior to treatment.  KB1 connected @4hz.  LI9, LI5, LI4    1. Encounter for acupuncture visit  - ACUPUNCT W/ ELEC STIMUL 15 MIN  2. Chronic pain of right wrist      3. Acute pain of left ear  - acetic acid (VOSOL) 2 % otic solution; Place 4 drops into the left ear 3 times daily for 7 days  Dispense: 1 each; Refill: 0

## 2025-02-27 ENCOUNTER — OFFICE VISIT (OUTPATIENT)
Age: 80
End: 2025-02-27

## 2025-02-27 ENCOUNTER — HOSPITAL ENCOUNTER (OUTPATIENT)
Facility: HOSPITAL | Age: 80
Setting detail: SPECIMEN
Discharge: HOME | End: 2025-03-02

## 2025-02-27 VITALS
WEIGHT: 170.2 LBS | DIASTOLIC BLOOD PRESSURE: 63 MMHG | SYSTOLIC BLOOD PRESSURE: 135 MMHG | BODY MASS INDEX: 34.31 KG/M2 | TEMPERATURE: 97.9 F | OXYGEN SATURATION: 98 % | HEART RATE: 70 BPM

## 2025-02-27 DIAGNOSIS — M65.321 TRIGGER FINGER OF ALL DIGITS OF RIGHT HAND: ICD-10-CM

## 2025-02-27 DIAGNOSIS — Z76.89 ENCOUNTER FOR ACUPUNCTURE VISIT: Primary | ICD-10-CM

## 2025-02-27 DIAGNOSIS — N39.3 STRESS INCONTINENCE: ICD-10-CM

## 2025-02-27 DIAGNOSIS — D17.20 LIPOMA OF FOOT: ICD-10-CM

## 2025-02-27 DIAGNOSIS — M25.531 CHRONIC PAIN OF RIGHT WRIST: ICD-10-CM

## 2025-02-27 DIAGNOSIS — M65.341 TRIGGER FINGER OF ALL DIGITS OF RIGHT HAND: ICD-10-CM

## 2025-02-27 DIAGNOSIS — M65.351 TRIGGER FINGER OF ALL DIGITS OF RIGHT HAND: ICD-10-CM

## 2025-02-27 DIAGNOSIS — G89.29 CHRONIC PAIN OF RIGHT WRIST: ICD-10-CM

## 2025-02-27 DIAGNOSIS — M65.331 TRIGGER FINGER OF ALL DIGITS OF RIGHT HAND: ICD-10-CM

## 2025-02-27 DIAGNOSIS — M65.311 TRIGGER FINGER OF ALL DIGITS OF RIGHT HAND: ICD-10-CM

## 2025-02-27 PROCEDURE — 81001 URINALYSIS AUTO W/SCOPE: CPT

## 2025-02-27 ASSESSMENT — PATIENT HEALTH QUESTIONNAIRE - PHQ9
SUM OF ALL RESPONSES TO PHQ QUESTIONS 1-9: 0
1. LITTLE INTEREST OR PLEASURE IN DOING THINGS: NOT AT ALL
SUM OF ALL RESPONSES TO PHQ QUESTIONS 1-9: 0
SUM OF ALL RESPONSES TO PHQ9 QUESTIONS 1 & 2: 0
SUM OF ALL RESPONSES TO PHQ QUESTIONS 1-9: 0
SUM OF ALL RESPONSES TO PHQ QUESTIONS 1-9: 0
2. FEELING DOWN, DEPRESSED OR HOPELESS: NOT AT ALL

## 2025-02-27 NOTE — PROGRESS NOTES
ACUPUNCTURE VISIT    Sosa Yeager is a 79 y.o. female.    Acupuncture visit # 2    Last visit: 12/9  Interval progress:  Her hands are still painful and lately when she is washing dishes or her hands the fingers in her right hand will get stuck in extension and deviation.     A few weeks ago she had 8 days of discolored urine, it was pinkish brownish. It has since resolved. It started after she lifted something heavy.    She has noticed swelling on her right ankle that wasn't there before. Not too painful. Worse at the end of the day.    Symptoms to be addressed today: hand pain/ trigger fingers.    ROS - see above; otherwise normal.    PHYSICAL EXAM -  Alert and Oriented x3, NAD  Normal respirations  Normal pulses  Bony deformities   Nodules on MCPs  3 Discrete fatty mobile masses on right foot and ankle.    PROCEDURE NOTE-  Informed consent obtained.  Time out performed prior to treatment.  Bebeto Rodriguez B/ROGER  LI4  LU7    Blue poppy oil.    1. Encounter for acupuncture visit    2. Chronic pain of right wrist  3. Trigger finger of all digits of right hand    4. Stress incontinence  Check urine.  Start kegel exercises. Discussed and handout provided.

## 2025-02-27 NOTE — PROGRESS NOTES
Zulema assisted with [place]an translation    \"Have you been to the ER, urgent care clinic since your last visit?  Hospitalized since your last visit?\"    NO    “Have you seen or consulted any other health care providers outside our system since your last visit?”    NO             Vitals:    02/27/25 1330 02/27/25 1335   BP: (!) 140/62 135/63   Site: Left Upper Arm Right Upper Arm   Position: Sitting Sitting   Cuff Size: Medium Adult Large Adult   Pulse: 70    Temp: 97.9 °F (36.6 °C)    TempSrc: Temporal    SpO2: 98%    Weight: 77.2 kg (170 lb 3.2 oz)

## 2025-02-27 NOTE — CONSULTS
;      [facility] Emergency Department  [address], [place] [address]  [phone]     Clinical Summary     PERSON INFORMATION   Name JAMAL OTERO Age  66 Years  1954 12:00 AM   Acct# [id number] Sex Female Phone [phone]   Dispo Type Home - (i.e. Home on oxygen, DME)-  Arrival 2020 6:08 AM Checkout 2020 8:00 AM    Address: [address]  [place] [address]      Visit Reason RIGHT FLANK PAIN     ED Physician Note      ED Time Seen By Provider Entered On:  2020 6:28     Performed On:  2020 6:28  by Debby Pandya May               Time Seen By Provider   Time Seen by Provider :   2020 6:28    Debby Pandya May - 2020 6:28           VITALS INFORMATION  Vitals/Ht/Wt  Temperature Tympanic:  98.3 F  Peripheral Pulse Rate:  62 bpm  Respiratory Rate:  18 br/min  Oxygen Saturation:  96 %  Oxygen Therapy:  Room air  Systolic Blood Pressure:  116 mmHg  Diastolic Blood Pressure:  82 mmHg  Mean Arterial Pressure:  93 mmHg  Height:  0 cm  Weight:  98.6 kg       MEDICAL INFORMATION   Allergy Info:          iodinated radiocontrast dyes             Prescriptions:                  Prescription Display   acetaminophen-hydrocodone (Norco 325 mg-5 mg oral tablet) 1 tab, PO, TID, PRN for pain, x 3 day, # 9 tab, 0 Refill(s), Tablet   docusate (docusate sodium 100 mg oral tablet) 100 mg = 1 tab, PO, Tablet, BID, PRN for constipation, # 40 tab, 0 Refill(s)   polyethylene glycol 3350 (MiraLax 17 g oral powder) 17 g = 1 packet, PO, Packet, qDay, dissolve in water before taking, X 5 day, # 5 packet, 0 Refill(s)          DISCHARGE INFORMATION   Discharge Disposition: Home - (i.e. Home on oxygen, DME)-      PATIENT EDUCATION INFORMATION   Instructions:       Constipation, Adult, Easy-to-Read   Follow up:                  With: Address: When:   Return to Emergency Department     Comments:   Return if symptoms worsen, fevers, uncontrolled vomiting,  Session ID: [id number]  Language: Urdu   ID: #[id number]   Name: Hayden uncontrolled pain, new symptoms       With: Address: When:   Follow up with primary care provider  Within 2 to 3 days   Comments:   Call for follow up appointment   Follow-Up As Directed     Follow-up with your doctor regarding visit today.   Take stool softeners as directed for constipation.  Take MiraLAX as well.   Take Norco only if absolutely necessary as this can worsen your constipation, do not drive or drink alcohol if taking.            DIAGNOSIS

## 2025-02-27 NOTE — PROGRESS NOTES
Pt's name and  verified. AVS provided. Pelvic floor exercise handout provided to pt per provider. Pt instructed to schedule next available acupuncture appointment. Time allowed for questions, no questions at this time. Malaika Sifuentes RN

## 2025-02-28 LAB
APPEARANCE UR: CLEAR
BACTERIA URNS QL MICRO: NEGATIVE /HPF
BILIRUB UR QL: NEGATIVE
COLOR UR: NORMAL
EPITH CASTS URNS QL MICRO: NORMAL /LPF
GLUCOSE UR STRIP.AUTO-MCNC: NEGATIVE MG/DL
HGB UR QL STRIP: NEGATIVE
KETONES UR QL STRIP.AUTO: NEGATIVE MG/DL
LEUKOCYTE ESTERASE UR QL STRIP.AUTO: NEGATIVE
NITRITE UR QL STRIP.AUTO: NEGATIVE
PH UR STRIP: 7 (ref 5–8)
PROT UR STRIP-MCNC: NEGATIVE MG/DL
RBC #/AREA URNS HPF: NORMAL /HPF (ref 0–5)
SP GR UR REFRACTOMETRY: 1.02 (ref 1–1.03)
UROBILINOGEN UR QL STRIP.AUTO: 0.2 EU/DL (ref 0.2–1)
WBC URNS QL MICRO: NORMAL /HPF (ref 0–4)

## 2025-03-03 NOTE — RESULT ENCOUNTER NOTE
Please let the pt/daughter know that her urine is normal. Continue kegels for now and we'll discuss her response at followup

## 2025-03-20 ENCOUNTER — RESULTS FOLLOW-UP (OUTPATIENT)
Facility: HOSPITAL | Age: 80
End: 2025-03-20

## 2025-03-27 ENCOUNTER — OFFICE VISIT (OUTPATIENT)
Age: 80
End: 2025-03-27

## 2025-03-27 VITALS
OXYGEN SATURATION: 100 % | TEMPERATURE: 97.8 F | SYSTOLIC BLOOD PRESSURE: 130 MMHG | BODY MASS INDEX: 34.23 KG/M2 | DIASTOLIC BLOOD PRESSURE: 56 MMHG | WEIGHT: 169.8 LBS | HEART RATE: 67 BPM | HEIGHT: 59 IN

## 2025-03-27 DIAGNOSIS — M79.632 LEFT FOREARM PAIN: ICD-10-CM

## 2025-03-27 DIAGNOSIS — M25.541 JOINT PAIN IN FINGERS OF RIGHT HAND: ICD-10-CM

## 2025-03-27 DIAGNOSIS — Z76.89 ENCOUNTER FOR ACUPUNCTURE VISIT: Primary | ICD-10-CM

## 2025-03-27 PROCEDURE — 1123F ACP DISCUSS/DSCN MKR DOCD: CPT | Performed by: FAMILY MEDICINE

## 2025-03-27 PROCEDURE — 97810 ACUP 1/> WO ESTIM 1ST 15 MIN: CPT | Performed by: FAMILY MEDICINE

## 2025-03-27 SDOH — HEALTH STABILITY: PHYSICAL HEALTH: ON AVERAGE, HOW MANY MINUTES DO YOU ENGAGE IN EXERCISE AT THIS LEVEL?: 30 MIN

## 2025-03-27 SDOH — SOCIAL STABILITY: SOCIAL INSECURITY: ARE YOU MARRIED, WIDOWED, DIVORCED, SEPARATED, NEVER MARRIED, OR LIVING WITH A PARTNER?: MARRIED

## 2025-03-27 SDOH — HEALTH STABILITY: MENTAL HEALTH: HOW MANY DRINKS CONTAINING ALCOHOL DO YOU HAVE ON A TYPICAL DAY WHEN YOU ARE DRINKING?: PATIENT DOES NOT DRINK

## 2025-03-27 SDOH — SOCIAL STABILITY: SOCIAL NETWORK: HOW OFTEN DO YOU ATTEND MEETINGS OF THE CLUBS OR ORGANIZATIONS YOU BELONG TO?: NEVER

## 2025-03-27 SDOH — SOCIAL STABILITY: SOCIAL NETWORK: HOW OFTEN DO YOU GET TOGETHER WITH FRIENDS OR RELATIVES?: TWICE A WEEK

## 2025-03-27 SDOH — SOCIAL STABILITY: SOCIAL NETWORK
DO YOU BELONG TO ANY CLUBS OR ORGANIZATIONS SUCH AS CHURCH GROUPS, UNIONS, FRATERNAL OR ATHLETIC GROUPS, OR SCHOOL GROUPS?: NO

## 2025-03-27 SDOH — HEALTH STABILITY: MENTAL HEALTH: HOW OFTEN DO YOU HAVE A DRINK CONTAINING ALCOHOL?: NEVER

## 2025-03-27 SDOH — HEALTH STABILITY: MENTAL HEALTH
DO YOU FEEL STRESS - TENSE, RESTLESS, NERVOUS, OR ANXIOUS, OR UNABLE TO SLEEP AT NIGHT BECAUSE YOUR MIND IS TROUBLED ALL THE TIME - THESE DAYS?: NOT AT ALL

## 2025-03-27 SDOH — SOCIAL STABILITY: SOCIAL INSECURITY: WITHIN THE LAST YEAR, HAVE YOU BEEN AFRAID OF YOUR PARTNER OR EX-PARTNER?: NO

## 2025-03-27 SDOH — HEALTH STABILITY: PHYSICAL HEALTH: ON AVERAGE, HOW MANY DAYS PER WEEK DO YOU ENGAGE IN MODERATE TO STRENUOUS EXERCISE (LIKE A BRISK WALK)?: 4 DAYS

## 2025-03-27 SDOH — SOCIAL STABILITY: SOCIAL INSECURITY
WITHIN THE LAST YEAR, HAVE YOU BEEN RAPED OR FORCED TO HAVE ANY KIND OF SEXUAL ACTIVITY BY YOUR PARTNER OR EX-PARTNER?: NO

## 2025-03-27 SDOH — ECONOMIC STABILITY: FOOD INSECURITY: WITHIN THE PAST 12 MONTHS, YOU WORRIED THAT YOUR FOOD WOULD RUN OUT BEFORE YOU GOT THE MONEY TO BUY MORE.: NEVER TRUE

## 2025-03-27 SDOH — SOCIAL STABILITY: SOCIAL INSECURITY: WITHIN THE LAST YEAR, HAVE YOU BEEN HUMILIATED OR EMOTIONALLY ABUSED IN OTHER WAYS BY YOUR PARTNER OR EX-PARTNER?: NO

## 2025-03-27 SDOH — ECONOMIC STABILITY: FOOD INSECURITY: HOW HARD IS IT FOR YOU TO PAY FOR THE VERY BASICS LIKE FOOD, HOUSING, MEDICAL CARE, AND HEATING?: NOT HARD AT ALL

## 2025-03-27 SDOH — ECONOMIC STABILITY: HOUSING INSECURITY: IN THE LAST 12 MONTHS, WAS THERE A TIME WHEN YOU WERE NOT ABLE TO PAY THE MORTGAGE OR RENT ON TIME?: NO

## 2025-03-27 SDOH — ECONOMIC STABILITY: TRANSPORTATION INSECURITY: IN THE PAST 12 MONTHS, HAS LACK OF TRANSPORTATION KEPT YOU FROM MEDICAL APPOINTMENTS OR FROM GETTING MEDICATIONS?: NO

## 2025-03-27 SDOH — ECONOMIC STABILITY: FOOD INSECURITY: WITHIN THE PAST 12 MONTHS, THE FOOD YOU BOUGHT JUST DIDN'T LAST AND YOU DIDN'T HAVE MONEY TO GET MORE.: NEVER TRUE

## 2025-03-27 SDOH — SOCIAL STABILITY: SOCIAL NETWORK: HOW OFTEN DO YOU ATTEND CHURCH OR RELIGIOUS SERVICES?: NEVER

## 2025-03-27 ASSESSMENT — ACTIVITIES OF DAILY LIVING (ADL): LACK_OF_TRANSPORTATION: NO

## 2025-03-27 ASSESSMENT — PATIENT HEALTH QUESTIONNAIRE - PHQ9
2. FEELING DOWN, DEPRESSED OR HOPELESS: NOT AT ALL
SUM OF ALL RESPONSES TO PHQ QUESTIONS 1-9: 0
1. LITTLE INTEREST OR PLEASURE IN DOING THINGS: NOT AT ALL

## 2025-03-27 NOTE — PROGRESS NOTES
At followup, plan to repeat labs (Lipids, TSH)    ACUPUNCTURE VISIT    Sosa Yeager is a 79 y.o. female.    Acupuncture visit # 3    Last visit: 2/27/25  Interval progress: She continues to have R hand pain. A little less getting stuck.  She is also having some pain of her left forearm with tenderness to palpation.  She states when they try to draw blood from that area they can never find the vein, so they end up using her wrist.    Symptoms to be addressed today: R hand pain/ L forearm pain    ROS - see above; otherwise normal.    PHYSICAL EXAM -  Alert and Oriented x3, NAD  Normal respirations  Normal pulses  TTP over left lateral forearm.  Flexion and Extension of fingers on right is smoother than at prior visits.    PROCEDURE NOTE-  Informed consent obtained.  Time out performed prior to treatment.  Bebeto Rodriguez (R)  TH4, MH3  LI 4    SRT left forearm over circling tender area  GV20, GV24.5      1. Encounter for acupuncture visit  - ACUPUNCT W/O ELEC STIMUL 15 MIN    2. Joint pain in fingers of right hand  3. Left forearm pain

## 2025-03-27 NOTE — PROGRESS NOTES
Chief Complaint   Patient presents with    Acupuncture     BP (!) 130/56 (BP Site: Right Upper Arm, Patient Position: Sitting, BP Cuff Size: Medium Adult)   Pulse 67   Temp 97.8 °F (36.6 °C) (Temporal)   Ht 1.499 m (4' 11\")   Wt 77 kg (169 lb 12.8 oz)   SpO2 100%   BMI 34.30 kg/m²   \"Have you been to the ER, urgent care clinic since your last visit?  Hospitalized since your last visit?\"    NO    “Have you seen or consulted any other health care providers outside our system since your last visit?”    NO

## 2025-05-05 ENCOUNTER — CLINICAL DOCUMENTATION (OUTPATIENT)
Age: 80
End: 2025-05-05

## 2025-05-05 DIAGNOSIS — Z71.89 COUNSELING AND COORDINATION OF CARE: Primary | ICD-10-CM

## 2025-05-05 NOTE — PROGRESS NOTES
Patient's daughter called [facility] clinic's nurse line on 5/5/2025 and left  asking for a follow up appointment as patient was recently seen in the ED. [facility] nurse navigator ROHITH Morley notified of request for follow up appointment. Plan in place for NN for call and discuss follow up with patient/patient's daughter.   CHELITA DUNCAN RN

## 2025-05-06 ENCOUNTER — TELEPHONE (OUTPATIENT)
Age: 80
End: 2025-05-06

## 2025-05-06 NOTE — TELEPHONE ENCOUNTER
Message received from Dr. Durand and our psr team that the patient was recently hospitalized and may need a provider follow-up appointment.    Per chart review, the patient had an ED to hospital admission at [facility] from 25 through 25 for c/o: dizziness and vomiting. Dx: hyponatremia induced seizure, acute metabolic encephalopathy, acute hyponatremia and acute hypoxic respiratory failure requiring intubation. Pt was hypertensive and tachypneic upon arrival to the ED and reportedly drank about 12 bottles of water over 2-3 hours prior to the onset of her symptoms.    T/C made to the patient with assistance from [facility]  #[id number]. The call was answered by the patient's daughter, Jo Ann South, who stated that the patient has been well since her discharge from [facility]. The patient is independent with ADLs, ambulates without assistance and is able to climb stairs if needed per her daughter.    Patient's only medication at this time is Levothyroxine per her daughter and patient has had a healthy appetite since returning home from the hospital. She also stated that they have a BP machine at home which they usually use once a week to check the patient's blood pressure. Encouraged patient's daughter to check  patient's blood pressure at least several times per week and if symptomatic and to please keep a log of the readings that she can bring with them to the patient's medical appointments.     The patient's enrollment in the [facility] referral program  on 2023. Reviewed with patient's daughter that the [facility] 's are available if needed to help with the [facility] financial assistance application once the patient receives a bill for the hospital admission.    Patient's daughter agreed to a hospital follow-up appointment for the patient on 05-15-25, 11:30am with Dr. Durand at our [place] clinic. Patient's daughter had no questions at the close of our call and the

## 2025-05-14 NOTE — PROGRESS NOTES
Sosa Yeager is a 79 y.o. female   Chief Complaint   Patient presents with   • Follow-Up from Hospital     ED visit f/u low sodium.     ASSESSMENT AND PLAN:    1. Hospital discharge follow-up  - NM DISCHARGE MEDS RECONCILED W/ CURRENT OUTPATIENT MED LIST    2. Hyponatremia  Repeat labs today.  Was likely from excess water consumption.  - CBC with Auto Differential; Future  - Comprehensive Metabolic Panel; Future    3. Acquired hypothyroidism  Repeat today; adjust levothyroxine if indicated  - TSH; Future    4. Mixed hyperlipidemia  PT not taking rosuvastatin. Repeat lipids.  - Lipid Panel; Future    SUBJECTIVE:    HPI:  Sosa Yeager is a 79 y.o. female who presents  for Hospital follow-up  Admitted to [facility] 4/11-4/14 for Acute metabolic encephalopathy/ Acute hypotonic hyponatremia/ Hyponatremia induced seizure/Acute hypoxic respiratory failure requiring intubation.  It appears she had made dandelion tea picked from the backyard and there was concern that they had been sprayed with pesticides. She then drank ~10 bottles of water in 2 hours.  Na was 119 on presentation. She was also hypertensive (188/85) and tachypneic (60 rpm).    Feeling back to normal. Family was very surprised because she's always been so healthy and this came out of nowhere.  Didn't want to come in right away because her arms were so sore from all the IV s and blood draws.  No new concerns.    Review of Systems   Constitutional:  Negative for fatigue, fever and unexpected weight change.   Eyes:  Negative for visual disturbance.   Respiratory:  Negative for cough and shortness of breath.    Cardiovascular:  Negative for chest pain and palpitations.   Gastrointestinal:  Negative for abdominal pain, constipation, diarrhea and nausea.   Neurological:  Negative for dizziness and headaches.       /81 (BP Site: Right Upper Arm, Patient Position: Sitting)   Pulse 73   Temp 98.4 °F (36.9 °C) (Temporal)   Wt 78.9 kg (174 lb)

## 2025-05-15 ENCOUNTER — HOSPITAL ENCOUNTER (OUTPATIENT)
Facility: HOSPITAL | Age: 80
Setting detail: SPECIMEN
Discharge: HOME | End: 2025-05-18

## 2025-05-15 ENCOUNTER — OFFICE VISIT (OUTPATIENT)
Age: 80
End: 2025-05-15

## 2025-05-15 VITALS
SYSTOLIC BLOOD PRESSURE: 131 MMHG | BODY MASS INDEX: 35.14 KG/M2 | DIASTOLIC BLOOD PRESSURE: 81 MMHG | TEMPERATURE: 98.4 F | OXYGEN SATURATION: 94 % | HEART RATE: 73 BPM | WEIGHT: 174 LBS

## 2025-05-15 DIAGNOSIS — E03.9 ACQUIRED HYPOTHYROIDISM: ICD-10-CM

## 2025-05-15 DIAGNOSIS — E87.1 HYPONATREMIA: ICD-10-CM

## 2025-05-15 DIAGNOSIS — E78.2 MIXED HYPERLIPIDEMIA: ICD-10-CM

## 2025-05-15 DIAGNOSIS — Z09 HOSPITAL DISCHARGE FOLLOW-UP: Primary | ICD-10-CM

## 2025-05-15 LAB
ALBUMIN SERPL-MCNC: 3.6 G/DL (ref 3.5–5)
ALBUMIN/GLOB SERPL: 1.1 (ref 1.1–2.2)
ALP SERPL-CCNC: 88 U/L (ref 45–117)
ALT SERPL-CCNC: 26 U/L (ref 12–78)
ANION GAP SERPL CALC-SCNC: 3 MMOL/L (ref 2–12)
AST SERPL-CCNC: 18 U/L (ref 15–37)
BASOPHILS # BLD: 0.07 K/UL (ref 0–0.1)
BASOPHILS NFR BLD: 1 % (ref 0–1)
BILIRUB SERPL-MCNC: 0.2 MG/DL (ref 0.2–1)
BUN SERPL-MCNC: 28 MG/DL (ref 6–20)
BUN/CREAT SERPL: 42 (ref 12–20)
CALCIUM SERPL-MCNC: 9.2 MG/DL (ref 8.5–10.1)
CHLORIDE SERPL-SCNC: 110 MMOL/L (ref 97–108)
CHOLEST SERPL-MCNC: 279 MG/DL
CO2 SERPL-SCNC: 27 MMOL/L (ref 21–32)
CREAT SERPL-MCNC: 0.66 MG/DL (ref 0.55–1.02)
DIFFERENTIAL METHOD BLD: ABNORMAL
EOSINOPHIL # BLD: 0.28 K/UL (ref 0–0.4)
EOSINOPHIL NFR BLD: 4 % (ref 0–7)
ERYTHROCYTE [DISTWIDTH] IN BLOOD BY AUTOMATED COUNT: 13.2 % (ref 11.5–14.5)
GLOBULIN SER CALC-MCNC: 3.3 G/DL (ref 2–4)
GLUCOSE SERPL-MCNC: 94 MG/DL (ref 65–100)
HCT VFR BLD AUTO: 36.2 % (ref 35–47)
HDLC SERPL-MCNC: 53 MG/DL
HDLC SERPL: 5.3 (ref 0–5)
HGB BLD-MCNC: 11.4 G/DL (ref 11.5–16)
IMM GRANULOCYTES # BLD AUTO: 0 K/UL
IMM GRANULOCYTES NFR BLD AUTO: 0 %
LDLC SERPL CALC-MCNC: 200 MG/DL (ref 0–100)
LYMPHOCYTES # BLD: 2.59 K/UL (ref 0.8–3.5)
LYMPHOCYTES NFR BLD: 37 % (ref 12–49)
MCH RBC QN AUTO: 28.7 PG (ref 26–34)
MCHC RBC AUTO-ENTMCNC: 31.5 G/DL (ref 30–36.5)
MCV RBC AUTO: 91.2 FL (ref 80–99)
MONOCYTES # BLD: 0.35 K/UL (ref 0–1)
MONOCYTES NFR BLD: 5 % (ref 5–13)
NEUTS SEG # BLD: 3.71 K/UL (ref 1.8–8)
NEUTS SEG NFR BLD: 53 % (ref 32–75)
NRBC # BLD: 0 K/UL (ref 0–0.01)
NRBC BLD-RTO: 0 PER 100 WBC
PLATELET # BLD AUTO: 191 K/UL (ref 150–400)
POTASSIUM SERPL-SCNC: 4.2 MMOL/L (ref 3.5–5.1)
PROT SERPL-MCNC: 6.9 G/DL (ref 6.4–8.2)
RBC # BLD AUTO: 3.97 M/UL (ref 3.8–5.2)
RBC MORPH BLD: ABNORMAL
SODIUM SERPL-SCNC: 140 MMOL/L (ref 136–145)
TRIGL SERPL-MCNC: 130 MG/DL
TSH SERPL DL<=0.05 MIU/L-ACNC: 0.63 UIU/ML (ref 0.36–3.74)
VLDLC SERPL CALC-MCNC: 26 MG/DL
WBC # BLD AUTO: 7 K/UL (ref 3.6–11)

## 2025-05-15 PROCEDURE — 1111F DSCHRG MED/CURRENT MED MERGE: CPT | Performed by: FAMILY MEDICINE

## 2025-05-15 PROCEDURE — 85025 COMPLETE CBC W/AUTO DIFF WBC: CPT

## 2025-05-15 PROCEDURE — 99214 OFFICE O/P EST MOD 30 MIN: CPT | Performed by: FAMILY MEDICINE

## 2025-05-15 PROCEDURE — 80061 LIPID PANEL: CPT

## 2025-05-15 PROCEDURE — 84443 ASSAY THYROID STIM HORMONE: CPT

## 2025-05-15 PROCEDURE — 1123F ACP DISCUSS/DSCN MKR DOCD: CPT | Performed by: FAMILY MEDICINE

## 2025-05-15 PROCEDURE — 80053 COMPREHEN METABOLIC PANEL: CPT

## 2025-05-15 ASSESSMENT — PATIENT HEALTH QUESTIONNAIRE - PHQ9
SUM OF ALL RESPONSES TO PHQ QUESTIONS 1-9: 0
SUM OF ALL RESPONSES TO PHQ QUESTIONS 1-9: 0
1. LITTLE INTEREST OR PLEASURE IN DOING THINGS: NOT AT ALL
SUM OF ALL RESPONSES TO PHQ QUESTIONS 1-9: 0
SUM OF ALL RESPONSES TO PHQ QUESTIONS 1-9: 0
2. FEELING DOWN, DEPRESSED OR HOPELESS: NOT AT ALL

## 2025-05-15 ASSESSMENT — ENCOUNTER SYMPTOMS
COUGH: 0
SHORTNESS OF BREATH: 0
CONSTIPATION: 0
DIARRHEA: 0
ABDOMINAL PAIN: 0
NAUSEA: 0

## 2025-05-15 NOTE — PROGRESS NOTES
Have you been to the ER, urgent care clinic since your last visit?  Hospitalized since your last visit?   04/2025, willie,  feeling confused.    Have you seen or consulted any other health care providers outside our system since your last visit?   NO

## 2025-05-16 ENCOUNTER — RESULTS FOLLOW-UP (OUTPATIENT)
Age: 80
End: 2025-05-16

## 2025-05-16 DIAGNOSIS — E78.2 MIXED HYPERLIPIDEMIA: Primary | ICD-10-CM

## 2025-05-16 RX ORDER — ATORVASTATIN CALCIUM 40 MG/1
40 TABLET, FILM COATED ORAL DAILY
Qty: 90 TABLET | Refills: 1 | Status: SHIPPED | OUTPATIENT
Start: 2025-05-16

## 2025-05-16 NOTE — RESULT ENCOUNTER NOTE
Please let the pt/daughter know that her sodium and potassium levels are normal.  Normal liver and kidney labs.    Her LDL cholesterol has gotten even higher. We want it ideally less than 100, but will accept <130. Hers is 200. This increases the risk for heart attack or stroke and I do recommend that she take the medication for it: atorvastatin. I've resent it to her pharmacy.    She is mildly anemic. I recommend she eat an iron-rich diet.    Her thyroid is in the normal range.

## 2025-05-19 DIAGNOSIS — E03.9 ACQUIRED HYPOTHYROIDISM: ICD-10-CM

## 2025-05-19 RX ORDER — LEVOTHYROXINE SODIUM 112 UG/1
112 TABLET ORAL DAILY
Qty: 90 TABLET | Refills: 0 | Status: SHIPPED | OUTPATIENT
Start: 2025-05-19

## 2025-05-24 DIAGNOSIS — E03.9 ACQUIRED HYPOTHYROIDISM: ICD-10-CM

## 2025-05-27 ENCOUNTER — OFFICE VISIT (OUTPATIENT)
Age: 80
End: 2025-05-27

## 2025-05-27 VITALS
SYSTOLIC BLOOD PRESSURE: 121 MMHG | WEIGHT: 173.6 LBS | BODY MASS INDEX: 35.06 KG/M2 | DIASTOLIC BLOOD PRESSURE: 75 MMHG | HEART RATE: 68 BPM | TEMPERATURE: 97.7 F

## 2025-05-27 DIAGNOSIS — E03.9 ACQUIRED HYPOTHYROIDISM: ICD-10-CM

## 2025-05-27 DIAGNOSIS — M25.541 JOINT PAIN IN FINGERS OF RIGHT HAND: ICD-10-CM

## 2025-05-27 DIAGNOSIS — Z76.89 ENCOUNTER FOR ACUPUNCTURE VISIT: Primary | ICD-10-CM

## 2025-05-27 PROCEDURE — 97810 ACUP 1/> WO ESTIM 1ST 15 MIN: CPT | Performed by: FAMILY MEDICINE

## 2025-05-27 RX ORDER — LEVOTHYROXINE SODIUM 112 UG/1
112 TABLET ORAL DAILY
Qty: 90 TABLET | Refills: 1 | Status: SHIPPED | OUTPATIENT
Start: 2025-05-27

## 2025-05-27 RX ORDER — LEVOTHYROXINE SODIUM 112 UG/1
112 TABLET ORAL DAILY
Qty: 90 TABLET | Refills: 0 | OUTPATIENT
Start: 2025-05-27

## 2025-05-27 ASSESSMENT — PATIENT HEALTH QUESTIONNAIRE - PHQ9
SUM OF ALL RESPONSES TO PHQ QUESTIONS 1-9: 0
2. FEELING DOWN, DEPRESSED OR HOPELESS: NOT AT ALL
1. LITTLE INTEREST OR PLEASURE IN DOING THINGS: NOT AT ALL
SUM OF ALL RESPONSES TO PHQ QUESTIONS 1-9: 0

## 2025-05-27 ASSESSMENT — SOCIAL DETERMINANTS OF HEALTH (SDOH)
HOW OFTEN DO YOU GET TOGETHER WITH FRIENDS OR RELATIVES?: ONCE A WEEK
IN A TYPICAL WEEK, HOW MANY TIMES DO YOU TALK ON THE PHONE WITH FAMILY, FRIENDS, OR NEIGHBORS?: MORE THAN THREE TIMES A WEEK
HOW OFTEN DO YOU ATTENT MEETINGS OF THE CLUB OR ORGANIZATION YOU BELONG TO?: NEVER
HOW OFTEN DO YOU ATTEND CHURCH OR RELIGIOUS SERVICES?: NEVER
DO YOU BELONG TO ANY CLUBS OR ORGANIZATIONS SUCH AS CHURCH GROUPS UNIONS, FRATERNAL OR ATHLETIC GROUPS, OR SCHOOL GROUPS?: NO

## 2025-05-27 NOTE — PROGRESS NOTES
Chief Complaint   Patient presents with    Acupuncture     /75 (BP Site: Left Upper Arm, Patient Position: Sitting, BP Cuff Size: Medium Adult)   Pulse 68   Temp 97.7 °F (36.5 °C) (Temporal)   Wt 78.7 kg (173 lb 9.6 oz)   BMI 35.06 kg/m²   No results found for this visit on 05/27/25.      Have you been to the ER, urgent care clinic since your last visit?  Hospitalized since your last visit?   NO    Have you seen or consulted any other health care providers outside our system since your last visit?   NO

## 2025-05-27 NOTE — PROGRESS NOTES
ACUPUNCTURE VISIT    Sosa Yeager is a 79 y.o. female.    Acupuncture visit # 4    Last visit: 3/27/25  Interval progress: Pt reports she gets about 8 days of relief after each acupunture treatment. She has also feels like there is a little less deviation of her joints.  Currently right 3rd digit is the most painful  She is having some numbness to her right hand intermittently, improved with movement.    Symptoms to be addressed today: B/L hand pain    ROS - see above; otherwise normal.    PHYSICAL EXAM -  Alert and Oriented x3, NAD  Normal respirations  Normal pulses  Joint deformities.  Decreased ROM in digits.  Tenderness.    PROCEDURE NOTE-  Informed consent obtained.  Time out performed prior to treatment.    - NORMA SOLANO  - MH6, LI12  -GV20, GV24.5    1. Encounter for acupuncture visit  - ACUPUNCT W/O ELEC STIMUL 15 MIN    2. Joint pain in fingers of right hand    3. Acquired hypothyroidism  - levothyroxine (SYNTHROID) 112 MCG tablet; Take 1 tablet by mouth daily  Dispense: 90 tablet; Refill: 1

## 2025-05-27 NOTE — PROGRESS NOTES
Sosa Yeager was seen at discharge. Patient verified their full name and . After visit Summary provided and reviewed with patient. RN advised patient when provider recommends to return for follow-up visit -next available acupuncture appointment. RN reviewed the provider's instructions with the patient, including medication instructions. Patient verbalized her understanding and denies having any further questions at this time.     Mingo Maddox, JEAN CARLOS

## 2025-07-07 ENCOUNTER — OFFICE VISIT (OUTPATIENT)
Age: 80
End: 2025-07-07

## 2025-07-07 VITALS
OXYGEN SATURATION: 96 % | DIASTOLIC BLOOD PRESSURE: 49 MMHG | SYSTOLIC BLOOD PRESSURE: 120 MMHG | TEMPERATURE: 97.3 F | BODY MASS INDEX: 34.42 KG/M2 | WEIGHT: 170.4 LBS | HEART RATE: 67 BPM

## 2025-07-07 DIAGNOSIS — Z76.89 ENCOUNTER FOR ACUPUNCTURE VISIT: Primary | ICD-10-CM

## 2025-07-07 DIAGNOSIS — M25.541 JOINT PAIN IN FINGERS OF RIGHT HAND: ICD-10-CM

## 2025-07-07 PROCEDURE — 97810 ACUP 1/> WO ESTIM 1ST 15 MIN: CPT | Performed by: FAMILY MEDICINE

## 2025-07-07 ASSESSMENT — PATIENT HEALTH QUESTIONNAIRE - PHQ9
SUM OF ALL RESPONSES TO PHQ QUESTIONS 1-9: 0
SUM OF ALL RESPONSES TO PHQ QUESTIONS 1-9: 0
1. LITTLE INTEREST OR PLEASURE IN DOING THINGS: NOT AT ALL
2. FEELING DOWN, DEPRESSED OR HOPELESS: NOT AT ALL
SUM OF ALL RESPONSES TO PHQ QUESTIONS 1-9: 0
SUM OF ALL RESPONSES TO PHQ QUESTIONS 1-9: 0

## 2025-07-07 NOTE — PROGRESS NOTES
Pt's name and  verified. AVS provided. Pt instructed to rest and hydrate. Pt also instructed to schedule next available acupuncture appointment per provider. Pt verbalizes understanding. Time allowed for questions, no questions at this time. Malaika Sifuentes RN

## 2025-07-07 NOTE — PROGRESS NOTES
ACUPUNCTURE VISIT    Sosa Yeager is a 79 y.o. female.    Acupuncture visit # 5    Last visit: 5/27  Interval progress: Pt is having pain, primarily in her 2nd and 3rd digits, B/L with R>L  Sometimes her right 3rd and 4th digit will get \"stuck\" together for a period of time.  She notes about 2 weeks of improvement  (pain-free) after her acupuncture session.    Symptoms to be addressed today: B/L digital/hand pain.    ROS - see above; otherwise normal.    PHYSICAL EXAM -  Alert and Oriented x3, NAD  Normal respirations  Normal pulses  Some IP joint deformity.  Nodule of 3rd extensor tendon (R)    PROCEDURE NOTE-  Informed consent obtained.  Time out performed prior to treatment.  NORMA SOLANO  SI7, SI6  LI5, LI4    1. Encounter for acupuncture visit  - ACUPUNCT W/O ELEC STIMUL 15 MIN    2. Joint pain in fingers of right hand

## 2025-07-07 NOTE — PROGRESS NOTES
Chief Complaint   Patient presents with    Acupuncture     Have you been to the ER, urgent care clinic since your last visit?  Hospitalized since your last visit?   NO    Have you seen or consulted any other health care providers outside our system since your last visit?   NO             Vitals:    07/07/25 1000 07/07/25 1004   BP: (!) 146/74 (!) 120/49   BP Site: Right Upper Arm Left Upper Arm   Patient Position: Sitting Sitting   BP Cuff Size: Medium Adult Large Adult   Pulse: 65 67   Temp: 97.3 °F (36.3 °C)    TempSrc: Temporal    SpO2: 96%    Weight: 77.3 kg (170 lb 6.4 oz)